# Patient Record
Sex: FEMALE | Race: WHITE | HISPANIC OR LATINO | Employment: FULL TIME | URBAN - METROPOLITAN AREA
[De-identification: names, ages, dates, MRNs, and addresses within clinical notes are randomized per-mention and may not be internally consistent; named-entity substitution may affect disease eponyms.]

---

## 2017-02-03 ENCOUNTER — HOSPITAL ENCOUNTER (OUTPATIENT)
Dept: RADIOLOGY | Facility: HOSPITAL | Age: 42
Discharge: HOME | End: 2017-02-03
Attending: INTERNAL MEDICINE
Payer: COMMERCIAL

## 2017-02-03 DIAGNOSIS — M54.40 LUMBAGO WITH SCIATICA: ICD-10-CM

## 2017-02-03 DIAGNOSIS — R20.2 NUMBNESS AND TINGLING IN LEFT HAND: ICD-10-CM

## 2017-02-03 DIAGNOSIS — M54.40 LUMBAGO WITH SCIATICA: Primary | ICD-10-CM

## 2017-02-03 DIAGNOSIS — R20.0 NUMBNESS AND TINGLING IN LEFT HAND: ICD-10-CM

## 2017-02-03 PROCEDURE — 73100 X-RAY EXAM OF WRIST: CPT | Mod: TC,LT

## 2017-02-03 PROCEDURE — 72100 X-RAY EXAM L-S SPINE 2/3 VWS: CPT | Mod: 26,,, | Performed by: RADIOLOGY

## 2017-02-03 PROCEDURE — 72120 X-RAY BEND ONLY L-S SPINE: CPT | Mod: TC

## 2017-02-03 PROCEDURE — 73100 X-RAY EXAM OF WRIST: CPT | Mod: 26,LT,, | Performed by: RADIOLOGY

## 2017-02-03 PROCEDURE — 73130 X-RAY EXAM OF HAND: CPT | Mod: TC,LT

## 2017-02-03 PROCEDURE — 72120 X-RAY BEND ONLY L-S SPINE: CPT | Mod: 26,,, | Performed by: RADIOLOGY

## 2017-02-03 PROCEDURE — 73130 X-RAY EXAM OF HAND: CPT | Mod: 26,LT,, | Performed by: RADIOLOGY

## 2017-02-11 ENCOUNTER — HOSPITAL ENCOUNTER (EMERGENCY)
Facility: HOSPITAL | Age: 42
Discharge: HOME | End: 2017-02-11
Attending: EMERGENCY MEDICINE
Payer: COMMERCIAL

## 2017-02-11 VITALS
TEMPERATURE: 99 F | OXYGEN SATURATION: 99 % | HEIGHT: 63 IN | RESPIRATION RATE: 18 BRPM | BODY MASS INDEX: 31.71 KG/M2 | DIASTOLIC BLOOD PRESSURE: 65 MMHG | SYSTOLIC BLOOD PRESSURE: 109 MMHG | HEART RATE: 64 BPM | WEIGHT: 179 LBS

## 2017-02-11 DIAGNOSIS — M54.31 RIGHT SIDED SCIATICA: Primary | ICD-10-CM

## 2017-02-11 LAB
B-HCG UR QL: NEGATIVE
CTP QC/QA: YES

## 2017-02-11 PROCEDURE — 99283 EMERGENCY DEPT VISIT LOW MDM: CPT | Mod: 25

## 2017-02-11 PROCEDURE — 63600175 PHARM REV CODE 636 W HCPCS: Performed by: PHYSICIAN ASSISTANT

## 2017-02-11 PROCEDURE — 96372 THER/PROPH/DIAG INJ SC/IM: CPT

## 2017-02-11 RX ORDER — KETOROLAC TROMETHAMINE 30 MG/ML
30 INJECTION, SOLUTION INTRAMUSCULAR; INTRAVENOUS
Status: COMPLETED | OUTPATIENT
Start: 2017-02-11 | End: 2017-02-11

## 2017-02-11 RX ORDER — DICLOFENAC SODIUM 50 MG/1
50 TABLET, DELAYED RELEASE ORAL 3 TIMES DAILY
Qty: 45 TABLET | Refills: 0 | Status: SHIPPED | OUTPATIENT
Start: 2017-02-11 | End: 2017-04-21

## 2017-02-11 RX ORDER — ORPHENADRINE CITRATE 30 MG/ML
30 INJECTION INTRAMUSCULAR; INTRAVENOUS
Status: COMPLETED | OUTPATIENT
Start: 2017-02-11 | End: 2017-02-11

## 2017-02-11 RX ADMIN — KETOROLAC TROMETHAMINE 30 MG: 30 INJECTION, SOLUTION INTRAMUSCULAR at 02:02

## 2017-02-11 RX ADMIN — ORPHENADRINE CITRATE 30 MG: 30 INJECTION INTRAMUSCULAR; INTRAVENOUS at 02:02

## 2017-02-11 NOTE — ED AVS SNAPSHOT
[facility] [address]  [place] [address]               Julieta Lewis   2017  2:06 PM   ED    Description:  Female : 1975   Department:  [facility]           Your Care was Coordinated By:     Provider Role From To    Paul Lew Jr., MD Attending Provider 17 [phone] --    Neha Vick PA-C Physician Assistant 17 [phone] --      Reason for Visit     Back Pain           Diagnoses this Visit        Comments    Right sided sciatica    -  Primary       ED Disposition     None           To Do List            These Medications        Disp Refills Start End    diclofenac (VOLTAREN) 50 MG EC tablet 45 tablet 0 2017     Take 1 tablet (50 mg total) by mouth 3 (three) times daily. - Oral      [facility]     [facility] -  Assistance  Registered nurses in the [facility] provide clinical advisement, health education, appointment booking, and other advisory services.  Call for this free service at [phone].             Medications           Message regarding Medications     Verify the changes and/or additions to your medication regime listed below are the same as discussed with your clinician today.  If any of these changes or additions are incorrect, please notify your healthcare provider.        START taking these NEW medications        Refills    diclofenac (VOLTAREN) 50 MG EC tablet 0    Sig: Take 1 tablet (50 mg total) by mouth 3 (three) times daily.    Class: Print    Route: Oral      These medications were administered today        Dose Freq    orphenadrine injection 30 mg 30 mg ED 1 Time    Sig: Inject 1 mL (30 mg total) into the muscle ED 1 Time.    Class: Normal    Route: Intramuscular    Cosign for Ordering: Accepted by Paul Lew Jr., MD on 2017  2:34 PM    ketorolac injection 30 mg 30 mg ED 1 Time    Sig: Inject 30 mg into the muscle ED 1 Time.    Class:  "Normal    Route: Intramuscular    Cosign for Ordering: Accepted by Paul Lew Jr., MD on 2/11/2017  2:34 PM      STOP taking these medications     diclofenac (CATAFLAM) 50 MG tablet Take 1 tablet (50 mg total) by mouth 3 (three) times daily. Take with meals t.i.d.           Verify that the below list of medications is an accurate representation of the medications you are currently taking.  If none reported, the list may be blank. If incorrect, please contact your healthcare provider. Carry this list with you in case of emergency.           Current Medications     diclofenac (VOLTAREN) 50 MG EC tablet Take 1 tablet (50 mg total) by mouth 3 (three) times daily.           Clinical Reference Information           Your Vitals Were     BP Pulse Temp Resp Height Weight    102/70 (BP Location: Left arm, Patient Position: Sitting) 62 98.2 °F (36.8 °C) (Oral) 18 5' 3" (1.6 m) 81.2 kg (179 lb)    Last Period SpO2 BMI          01/18/2017 99% 31.71 kg/m2        Allergies as of 2/11/2017     No Known Allergies      Immunizations Administered on Date of Encounter - 2/11/2017     None      ED Micro, Lab, POCT     Start Ordered       Status Ordering Provider    02/11/17 1431 02/11/17 1430  POCT urine pregnancy  Once      Acknowledged     02/11/17 0000 02/11/17 1440  POCT urine pregnancy     Comments:  This order was created through External Result Entry    Completed       ED Imaging Orders     None        Discharge Instructions         Sciatica    Sciatica is a condition that causes pain in the lower back that spreads down into the buttock, hip, and leg. Sometimes the leg pain can happen without any back pain. Sciatica happens when a spinal nerve is irritated or has pressure put on it as comes out of the spinal canal in the lower back. This most often happens when a bulge or rupture of a nearby spinal disk presses on the nerve. Sciatica can also be caused by a narrowing of the spinal canal (spinal stenosis) or spasm of the " muscle in the buttocks that the sciatic nerve passes through (pyriform muscle). Sciatica is also called lumbar radiculopathy.  Sciatica may begin after a sudden twisting or bending force, such as in a car accident. Or it can happen after a simple awkward movement. In either case, muscle spasm often also happens. Muscle spasm makes the pain worse.  A healthcare provider makes a diagnosis of sciatica from your symptoms and a physical exam. Unless you had an injury from a car accident or fall, you usually wont have X-rays taken at this time. This is because the nerves and disks in your back cant be seen on an X-ray. If the provider sees signs of a compressed nerve, you will need to schedule an MRI scan as an outpatient. Signs of a compressed nerve include loss of strength in a leg.  Most sciatica gets better with medicine, exercise, and physical therapy. If your symptoms continue after at least 3 months of medical treatment, you may need surgery or injections to your lower back.  Home care  Follow these tips when caring for yourself at home:  · You may need to stay in bed the first few days. But as soon as possible, begin sitting up or walking. This will help you avoid problems that come from staying in bed for long periods.  · When in bed, try to find a position that is comfortable. A firm mattress is best. Try lying flat on your back with pillows under your knees. You can also try lying on your side with your knees bent up toward your chest and a pillow between your knees.  · Avoid sitting for long periods. This puts more stress on your lower back than standing or walking.  · Use heat from a hot shower, hot bath, or heating pad to help ease pain. Massage can also help. You can also try using an ice pack. You can make your own ice pack by putting ice cubes in a plastic bag. Wrap the bag in a thin towel. Try both heat and cold to see which works best. Use the method that feels best for 20 minutes several times a  day.  · You may use acetaminophen or ibuprofen to ease pain, unless another pain medicine was prescribed. Note: If you have chronic liver or kidney disease, talk with your healthcare provider before taking these medicines. Also talk with your provider if youve had a stomach ulcer or gastrointestinal bleeding.  · Use safe lifting methods. Dont lift anything heavier than 15 pounds until all of the pain is gone.  Follow-up care  Follow up with your healthcare provider, or as advised. You may need physical therapy or additional tests.  If X-rays were taken, a radiologist will look at them. You will be told of any new findings that may affect your care.  When to seek medical advice  Call your healthcare provider right away if any of these occur:  · Pain gets worse even after taking prescribed medicine  · Weakness or numbness in 1 or both legs or hips  · Numbness in your groin or genital area  · You cant control your bowel or bladder  · Fever  · Redness or swelling over your back or spine   Date Last Reviewed: 8/1/2016  © 2957-6208 [employer]. [address] [place] [address]. All rights reserved. This information is not intended as a substitute for professional medical care. Always follow your healthcare professional's instructions.          Smoking Cessation     If you would like to quit smoking:   You may be eligible for free services if you are a [place] resident and started smoking cigarettes before September 1, 1988.  Call the Smoking Cessation Trust (SCT) toll free at (742) 956-0440 or (476) 849-9838.   Call 2-002-QUIT-NOW if you do not meet the above criteria.             [facility] complies with applicable Federal civil rights laws and does not discriminate on the basis of race, color, national origin, age, disability, or sex.        Language Assistance Services     ATTENTION: Language assistance services are available, free of charge. Please call [phone].       ATENCIÓN: Si habla español, tiene a galvez disposición servicios gratuitos de asistencia lingüística. Llame al [phone].     CHÚ Ý: N?u b?n nói Ti?ng Vi?t, có các d?ch v? h? tr? ngôn ng? mi?n phí dành cho b?n. G?i s? [phone].

## 2017-02-11 NOTE — ED PROVIDER NOTES
Encounter Date: 2/11/2017       History     Chief Complaint   Patient presents with    Back Pain     chronic low back pain with radiation down rt leg. denies dysuria.     Review of patient's allergies indicates:  No Known Allergies  HPI Comments: Julieta Lewis, a 41 y.o. female that presents to the ED for back pain.  Was seen at the end of December for the same issue, given injection of toradol and norflex as well as a prescription for diclofenac.  Those medicine improved her condition, however she's run out.  She has followed up with her PCP who ordered an x-ray of her spine earlier this month and has an appointment with him on Wednesday of this week.  Denies any injury, loss of bowel or bladder, rash.        Patient is a 41 y.o. female presenting with the following complaint: back pain. The history is provided by the patient.   Back Pain    This is a recurrent problem. The current episode started today. The problem occurs constantly. The pain is associated with no known injury. The pain is present in the sacro-iliac joint. The quality of the pain is described as stabbing. The pain radiates to the right leg. The pain is at a severity of 8/10. The symptoms are aggravated by bending and certain positions. Associated symptoms include leg pain and tingling. Pertinent negatives include no fever, no numbness, no bowel incontinence, no perianal numbness, no bladder incontinence, no dysuria, no pelvic pain and no weakness. She has tried NSAIDs and muscle relaxants for the symptoms. The treatment provided moderate relief. Risk factors: Works as a .       History reviewed. No pertinent past medical history.  No past medical history pertinent negatives.  History reviewed. No pertinent past surgical history.  History reviewed. No pertinent family history.  Social History   Substance Use Topics    Smoking status: Current Every Day Smoker    Smokeless tobacco: None    Alcohol use Yes      Comment: occaisional      Review of Systems   Constitutional: Negative for fever.   Gastrointestinal: Negative for abdominal distention and bowel incontinence.   Genitourinary: Negative for bladder incontinence, difficulty urinating, dysuria and pelvic pain.   Musculoskeletal: Positive for back pain. Negative for gait problem, joint swelling and neck pain.   Skin: Negative for color change.   Neurological: Positive for tingling. Negative for weakness and numbness.   All other systems reviewed and are negative.      Physical Exam   Initial Vitals   BP Pulse Resp Temp SpO2   02/11/17 1400 02/11/17 1400 02/11/17 1400 02/11/17 1400 02/11/17 1400   102/70 62 18 98.2 °F (36.8 °C) 99 %     Physical Exam    Nursing note and vitals reviewed.  Constitutional: She appears well-developed and well-nourished. No distress.   HENT:   Head: Normocephalic and atraumatic.   Right Ear: External ear normal.   Left Ear: External ear normal.   Nose: Nose normal.   Eyes: Conjunctivae and EOM are normal.   Neck: Normal range of motion.   Musculoskeletal: Normal range of motion. She exhibits no tenderness.        Cervical back: Normal.        Thoracic back: Normal.        Lumbar back: Normal.   Increase pain with flexion, extension and R lateral bend of spine.  + right-sided SLR. No bony tenderness, spinal step offs.  Normal abduction and adduction of bilateral hips.  N/V intact.     Neurological: She is alert and oriented to person, place, and time.   Skin: Skin is warm and dry. No rash noted.   Psychiatric: She has a normal mood and affect. Thought content normal.         ED Course   Procedures  Labs Reviewed - No data to display          Medical Decision Making:   Initial Assessment:   Back pain  Differential Diagnosis:   Lumbar strain, sciatica, herniated disk  ED Management:  X-rays done on 02/03/17 showed no acute abnormalities.  Norflex and Toradol given in ED with improvement of pain.  Pt states she has an appointment with PCP this week and will f/u for  potential further testing.  Strict return precautions given and patient verbalized understanding.    RX: Diclofenac                    ED Course     Clinical Impression:   The encounter diagnosis was Right sided sciatica.          Neha Vick PA-C  02/11/17 1503

## 2017-02-11 NOTE — ED NOTES
41 year old female presents to ed with chief complaint of back pain. Patient reports hx of issues with  Chronic sciatic nerve pain. Patients states pain to  Right lumbar and down in to her right leg with numbness and tingling.

## 2017-02-11 NOTE — DISCHARGE INSTRUCTIONS

## 2017-02-16 ENCOUNTER — OFFICE VISIT (OUTPATIENT)
Dept: ORTHOPEDICS | Facility: CLINIC | Age: 42
End: 2017-02-16
Payer: COMMERCIAL

## 2017-02-16 VITALS
WEIGHT: 179 LBS | SYSTOLIC BLOOD PRESSURE: 112 MMHG | HEART RATE: 74 BPM | BODY MASS INDEX: 31.71 KG/M2 | HEIGHT: 63 IN | DIASTOLIC BLOOD PRESSURE: 75 MMHG

## 2017-02-16 DIAGNOSIS — M54.16 LUMBAR RADICULOPATHY: Primary | ICD-10-CM

## 2017-02-16 PROCEDURE — 99204 OFFICE O/P NEW MOD 45 MIN: CPT | Mod: S$GLB,,, | Performed by: PHYSICIAN ASSISTANT

## 2017-02-16 PROCEDURE — 99999 PR PBB SHADOW E&M-EST. PATIENT-LVL III: CPT | Mod: PBBFAC,,, | Performed by: PHYSICIAN ASSISTANT

## 2017-02-16 RX ORDER — AMOXICILLIN AND CLAVULANATE POTASSIUM 875; 125 MG/1; MG/1
TABLET, FILM COATED ORAL
Refills: 0 | COMMUNITY
Start: 2017-01-26 | End: 2017-09-08

## 2017-02-16 RX ORDER — METHOCARBAMOL 500 MG/1
TABLET, FILM COATED ORAL
Refills: 0 | COMMUNITY
Start: 2017-02-03 | End: 2018-10-15

## 2017-02-16 RX ORDER — METHYLPREDNISOLONE 4 MG/1
TABLET ORAL
Qty: 1 PACKAGE | Refills: 0 | Status: SHIPPED | OUTPATIENT
Start: 2017-02-16 | End: 2017-09-08

## 2017-02-16 RX ORDER — HYDROCODONE BITARTRATE AND ACETAMINOPHEN 7.5; 325 MG/1; MG/1
TABLET ORAL
Refills: 0 | COMMUNITY
Start: 2017-01-26 | End: 2017-09-08

## 2017-02-16 RX ORDER — IBUPROFEN 800 MG/1
TABLET ORAL
Refills: 0 | COMMUNITY
Start: 2017-02-03 | End: 2017-04-21

## 2017-02-16 NOTE — LETTER
February 16, 2017                   [facility]  [address]  [place] [address]  Phone: [phone]   Patient: Julieta Lewis   MR Number: [id number]   YOB: 1975   Date of Visit: 2/16/2017     To whom it may concern,    Julieta Lewis was seen in the Orthopaedic clinic 2/16/2017.  She is able to return to work 2/18/2017 without limitations.      If you have any questions, please contact the office at [phone].        Sincerely,          Dannielle Cool PA-C

## 2017-02-16 NOTE — PROGRESS NOTES
DATE: 2/16/2017  PATIENT: Julieta Lewis    Supervising Physician: Paul Salas M.D.    CHIEF COMPLAINT: back and buttock pain    HISTORY:  Julieta Lewis is a 41 y.o[place] of low back and right leg pain (Back - 8, Leg - 8). The pain has been present for 2 months. The patient describes the pain as aching and sharp.  The pain is worse with standing, walking and sitting and improved by laying down. There is associated numbness and tingling. There is subjective weakness. Prior treatments have included voltaren, robaxin and norco, but no ESIs or surgery.    The patient denies myelopathic symptoms such as handwriting changes or difficulty with buttons/coins/keys. Denies perineal paresthesias, bowel/bladder dysfunction.    PAST MEDICAL/SURGICAL HISTORY:  History reviewed. No pertinent past medical history.  History reviewed. No pertinent past surgical history.    Medications:   Current Outpatient Prescriptions on File Prior to Visit   Medication Sig Dispense Refill    diclofenac (VOLTAREN) 50 MG EC tablet Take 1 tablet (50 mg total) by mouth 3 (three) times daily. 45 tablet 0     No current facility-administered medications on file prior to visit.        Social History:   Social History     Social History    Marital status:      Spouse name: N/A    Number of children: N/A    Years of education: N/A     Occupational History    Not on file.     Social History Main Topics    Smoking status: Current Every Day Smoker    Smokeless tobacco: Not on file    Alcohol use Yes      Comment: occaisional    Drug use: Not on file    Sexual activity: Not on file     Other Topics Concern    Not on file     Social History Narrative       REVIEW OF SYSTEMS:  Constitution: Negative. Negative for chills, fever and night sweats.   Cardiovascular: Negative for chest pain and syncope.   Respiratory: Negative for cough and shortness of breath.   Gastrointestinal: See HPI.  "Negative for nausea/vomiting. Negative for abdominal pain.  Genitourinary: See HPI. Negative for discoloration or dysuria.  Skin: Negative for dry skin, itching and rash.   Hematologic/Lymphatic: Negative for bleeding problem. Does not bruise/bleed easily.   Musculoskeletal: Negative for falls and muscle weakness.   Neurological: See HPI. No seizures.   Endocrine: Negative for polydipsia, polyphagia and polyuria.   Allergic/Immunologic: Negative for hives and persistent infections.     EXAM:  Visit Vitals    /75 (BP Location: Left arm, Patient Position: Sitting, BP Method: Automatic)    Pulse 74    Ht 5' 3" (1.6 m)    Wt 81.2 kg (179 lb 0.2 oz)    LMP 01/18/2017    BMI 31.71 kg/m2       General: The patient is a very pleasant 41 y.o. female in no apparent distress, the patient is oriented to person, place and time.  Psych: Normal mood and affect  HEENT: Vision grossly intact, hearing intact to the spoken word.  Lungs: Respirations unlabored.  Gait: Normal station and gait, no difficulty with toe or heel walk.   Skin: Dorsal lumbar skin negative for rashes, lesions, hairy patches and surgical scars. There is mild lumbar tenderness to palpation.  Range of motion: Lumbar range of motion is acceptable.  Spinal Balance: Global saggital and coronal spinal balance acceptable, not significant for scoliosis and kyphosis.  Musculoskeletal: No pain with the range of motion of the bilateral hips. No trochanteric tenderness to palpation.  Vascular: Bilateral lower extremities warm and well perfused, dorsalis pedis pulses 2+ bilaterally.  Neurological: Normal strength and tone in all major motor groups in the bilateral lower extremities.  There is decreased strength on the right compared to the left due to pain.  Normal sensation to light touch in the L2-S1 dermatomes bilaterally with the exception of decreased sensation in the L4/5 dermatomes on the right.  Deep tendon reflexes symmetric 2+ in the bilateral lower " extremities.  Negative Babinski bilaterally. Straight leg raise positive bilaterally, reproduces back pain.    IMAGING:      Today I personally reviewed AP, Lat and Flex/Ex  upright L-spine films that demonstrate normal disc spacing and alignment.  No acute abnormalities.      ASSESSMENT/PLAN:    Diagnoses and all orders for this visit:    Lumbar radiculopathy  -     MRI Lumbar Spine Without Contrast; Future    Other orders  -     methylPREDNISolone (MEDROL DOSEPACK) 4 mg tablet; use as directed      The patient has had back pain and right leg pain for the last two months that has failed to respond to medications.  MRI lumbar spine for further evaluation.  Follow up after the MRI to discuss results and further treatment including ESIs and possibly surgery.       Return if symptoms worsen or fail to improve.

## 2017-03-03 ENCOUNTER — TELEPHONE (OUTPATIENT)
Dept: ORTHOPEDICS | Facility: CLINIC | Age: 42
End: 2017-03-03

## 2017-03-03 NOTE — TELEPHONE ENCOUNTER
Called pt and explained to her that the medrol dosepack is a one time use RX, it can not be refilled. She explained to me why she did not make her appointment on Thursday 3/2/17 to see Dannielle Cool PA-C. She had missed her MRI appointment and needed to reschedule. I rescheduled her MRI and her MRI follow up appt. Patients verbally confirmed understanding.     Kenisha

## 2017-03-09 ENCOUNTER — HOSPITAL ENCOUNTER (OUTPATIENT)
Dept: RADIOLOGY | Facility: HOSPITAL | Age: 42
Discharge: HOME | End: 2017-03-09
Attending: ORTHOPAEDIC SURGERY
Payer: COMMERCIAL

## 2017-03-09 DIAGNOSIS — M54.16 LUMBAR RADICULOPATHY: ICD-10-CM

## 2017-03-09 PROCEDURE — 72148 MRI LUMBAR SPINE W/O DYE: CPT | Mod: 26,,, | Performed by: RADIOLOGY

## 2017-03-09 PROCEDURE — 72148 MRI LUMBAR SPINE W/O DYE: CPT | Mod: TC

## 2017-04-21 ENCOUNTER — OFFICE VISIT (OUTPATIENT)
Dept: ORTHOPEDICS | Facility: CLINIC | Age: 42
End: 2017-04-21
Payer: COMMERCIAL

## 2017-04-21 VITALS — WEIGHT: 179 LBS | BODY MASS INDEX: 31.71 KG/M2 | HEIGHT: 63 IN

## 2017-04-21 DIAGNOSIS — M54.5 LOW BACK PAIN, UNSPECIFIED BACK PAIN LATERALITY, UNSPECIFIED CHRONICITY, WITH SCIATICA PRESENCE UNSPECIFIED: Primary | ICD-10-CM

## 2017-04-21 PROCEDURE — 99213 OFFICE O/P EST LOW 20 MIN: CPT | Mod: S$GLB,,, | Performed by: PHYSICIAN ASSISTANT

## 2017-04-21 PROCEDURE — 1160F RVW MEDS BY RX/DR IN RCRD: CPT | Mod: S$GLB,,, | Performed by: PHYSICIAN ASSISTANT

## 2017-04-21 PROCEDURE — 99999 PR PBB SHADOW E&M-EST. PATIENT-LVL III: CPT | Mod: PBBFAC,,, | Performed by: PHYSICIAN ASSISTANT

## 2017-04-21 RX ORDER — GABAPENTIN 300 MG/1
CAPSULE ORAL
Refills: 1 | COMMUNITY
Start: 2017-02-14 | End: 2018-10-15

## 2017-04-21 RX ORDER — DICLOFENAC SODIUM 75 MG/1
75 TABLET, DELAYED RELEASE ORAL 2 TIMES DAILY
Qty: 60 TABLET | Refills: 1 | Status: SHIPPED | OUTPATIENT
Start: 2017-04-21 | End: 2017-04-24 | Stop reason: SDUPTHER

## 2017-04-21 NOTE — PROGRESS NOTES
"DATE: 4/21/2017  PATIENT: Julieta Lewis    Attending Physician: Paul Salas M.D.    HISTORY:  Julieta Lewis is a 42 y.o. female who returns to me today for follow up of back pain.  She was last seen by me 2/16/2017.  Today she is doing well but notes the pain has improved but she continues to have intermittent back pain particularly when going from sitting to standing.  She also continues to have bilateral wrist pain.     The Patient denies myelopathic symptoms such as handwriting changes or difficulty with buttons/coins/keys. Denies perineal paresthesias, bowel/bladder dysfunction.    PMH/PSH/FamHx/SocHx:  Unchanged from prior visit    ROS:  REVIEW OF SYSTEMS:  Constitution: Negative. Negative for chills, fever and night sweats.   HENT: Negative for congestion and headaches.    Eyes: Negative for blurred vision, left vision loss and right vision loss.   Cardiovascular: Negative for chest pain and syncope.   Respiratory: Negative for cough and shortness of breath.    Endocrine: Negative for polydipsia, polyphagia and polyuria.   Hematologic/Lymphatic: Negative for bleeding problem. Does not bruise/bleed easily.   Skin: Negative for dry skin, itching and rash.   Musculoskeletal: Negative for falls and muscle weakness.   Gastrointestinal: Negative for abdominal pain and bowel incontinence.   Allergic/Immunologic: Negative for hives and persistent infections.  Genitourinary: Negative for urinary retention/incontinence and nocturia.   Neurological: negative for disturbances in coordination, no myelopathic symptoms such as handwriting changes or difficulty with buttons, coins, keys or small objects. No loss of balance and seizures.   Psychiatric/Behavioral: Negative for depression. The patient does not have insomnia.   Denies perineal paresthesias, bowel or bladder incontinence    EXAM:  Ht 5' 3" (1.6 m)  Wt 81.2 kg (179 lb 0.2 oz)  LMP 02/21/2017  BMI 31.71 kg/m2    My physical examination was notable for the " following findings:     Normal station and gait, no difficulty with toe or heel walk.   Dorsal lumbar skin negative for rashes, lesions, hairy patches and surgical scars. There is mild lumbar tenderness to palpation.  Lumbar range of motion is acceptable.  Global saggital and coronal spinal balance acceptable, not significant for scoliosis and kyphosis.  No pain with the range of motion of the bilateral hips. No trochanteric tenderness to palpation.  Bilateral lower extremities warm and well perfused, dorsalis pedis pulses 2+ bilaterally.  Normal strength and tone in all major motor groups in the bilateral lower extremities. Normal sensation to light touch in the L2-S1 dermatomes bilaterally.  Deep tendon reflexes symmetric 2+ in the bilateral lower extremities.  Negative Babinski bilaterally. Straight leg raise negative bilaterally.      IMAGING:  No new imaging today.    Today I personally re- reviewed AP, Lat and Flex/Ex  upright L-spine that demonstrate normal disc spacing and alignment.  No acute abnormalities.     MRI lumbar spine demonstrates a large Schmorl's node at L5.  There is a small annular tear at L5/S1.       ASSESSMENT/PLAN:    Diagnoses and all orders for this visit:    Low back pain, unspecified back pain laterality, unspecified chronicity, with sciatica presence unspecified  -     Ambulatory Referral to Physical/Occupational Therapy    Other orders  -     diclofenac (VOLTAREN) 75 MG EC tablet; Take 1 tablet (75 mg total) by mouth 2 (two) times daily.    Referral for PT at [facility] today.  Appointment scheduled with hand clinic. Follow up after therapy in about 6 weeks if symptoms persist.     Return if symptoms worsen or fail to improve.

## 2017-04-24 RX ORDER — DICLOFENAC SODIUM 75 MG/1
75 TABLET, DELAYED RELEASE ORAL 2 TIMES DAILY
Qty: 60 TABLET | Refills: 1 | Status: SHIPPED | OUTPATIENT
Start: 2017-04-24 | End: 2017-05-24

## 2017-07-31 ENCOUNTER — OFFICE VISIT (OUTPATIENT)
Dept: URGENT CARE | Facility: CLINIC | Age: 42
End: 2017-07-31
Payer: COMMERCIAL

## 2017-07-31 VITALS
SYSTOLIC BLOOD PRESSURE: 110 MMHG | WEIGHT: 180 LBS | BODY MASS INDEX: 33.13 KG/M2 | HEIGHT: 62 IN | HEART RATE: 58 BPM | RESPIRATION RATE: 16 BRPM | TEMPERATURE: 98 F | DIASTOLIC BLOOD PRESSURE: 76 MMHG | OXYGEN SATURATION: 99 %

## 2017-07-31 DIAGNOSIS — M25.512 BILATERAL SHOULDER PAIN, UNSPECIFIED CHRONICITY: ICD-10-CM

## 2017-07-31 DIAGNOSIS — G56.03 BILATERAL CARPAL TUNNEL SYNDROME: Primary | ICD-10-CM

## 2017-07-31 DIAGNOSIS — M25.511 BILATERAL SHOULDER PAIN, UNSPECIFIED CHRONICITY: ICD-10-CM

## 2017-07-31 PROCEDURE — 99203 OFFICE O/P NEW LOW 30 MIN: CPT | Mod: 25,S$GLB,, | Performed by: FAMILY MEDICINE

## 2017-07-31 PROCEDURE — 96372 THER/PROPH/DIAG INJ SC/IM: CPT | Mod: S$GLB,,, | Performed by: FAMILY MEDICINE

## 2017-07-31 RX ORDER — BETAMETHASONE SODIUM PHOSPHATE AND BETAMETHASONE ACETATE 3; 3 MG/ML; MG/ML
9 INJECTION, SUSPENSION INTRA-ARTICULAR; INTRALESIONAL; INTRAMUSCULAR; SOFT TISSUE
Status: COMPLETED | OUTPATIENT
Start: 2017-07-31 | End: 2017-07-31

## 2017-07-31 RX ORDER — DICLOFENAC POTASSIUM 50 MG/1
50 TABLET, FILM COATED ORAL 3 TIMES DAILY
Qty: 30 TABLET | Refills: 0 | Status: SHIPPED | OUTPATIENT
Start: 2017-07-31 | End: 2017-12-31

## 2017-07-31 RX ADMIN — BETAMETHASONE SODIUM PHOSPHATE AND BETAMETHASONE ACETATE 9 MG: 3; 3 INJECTION, SUSPENSION INTRA-ARTICULAR; INTRALESIONAL; INTRAMUSCULAR; SOFT TISSUE at 05:07

## 2017-07-31 NOTE — LETTER
July 31, 2017      [facility]  [place] [address]  Phone: [phone]  Fax: [phone]       Patient: Julieta Lewis   YOB: 1975  Date of Visit: 07/31/2017    To Whom It May Concern:    Julieta Wang was at [facility] on 07/31/2017. She may return to work/school on 08/01/2017 with no restrictions. If you have any questions or concerns, or if I can be of further assistance, please do not hesitate to contact me.    Sincerely,    Pascale Chacon MA

## 2017-07-31 NOTE — PATIENT INSTRUCTIONS
Carpal Tunnel Syndrome    Carpal tunnel syndrome is a painful condition of the wrist and arm. It is caused by pressure on the median nerve.  The median nerve is one of the nerves that give feeling and movement to the hand. It passes through a tunnel in the wrist called the carpal tunnel. This tunnel is made up of bones and ligaments. Narrowing of this tunnel or swelling of the tissues inside the tunnel puts pressure on the median nerve. This causes numbness, pins and needles, or electric shooting pains in your hand and forearm. Often the pain is worse at night and may wake you when you are asleep.  Carpal tunnel syndrome may occur during pregnancy and with use of birth control pills. It is more common in workers who must often bend their wrists. It is also common in people who work with power tools that cause strong vibrations.  Home care  · Rest the painful wrist. Avoid repeated bending of the wrist back and forth. This puts pressure on the median nerve. Avoid using power tools with strong vibrations.  · If you were given a splint, wear it at night while you sleep. You may also wear it during the day for comfort.  · Move your fingers and wrists often to avoid stiffness.  · Elevate your arms on pillows when you lie down.  · Try using the unaffected hand more.  · Try not to hold your wrists in a bent, downward position.  · Sometimes changes in the work place may ease symptoms. If you type most of the day, it may help to change the position of your keyboard or add a wrist support. Your wrist should be in a neutral position and not bent back when typing.  · You may use over-the-counter pain medicine to treat pain and inflammation, unless another medicine was prescribed. Anti-inflammatory pain medicines, such as ibuprofen or naproxen may be more effective than acetaminophen, which treats pain, but not inflammation. If you have chronic liver or kidney disease or ever had a stomach ulcer or GI bleeding, talk with your  doctor before using these medicines.  · Opioid pain medicine will only give temporary relief and does not treat the problem. If pain continues, you may need a shot of a steroid drug into your wrist.  · If the above methods fail, you may need surgery. This will open the carpal tunnel and release the pressure on the trapped nerve.  Follow-up care  Follow up with your healthcare provider, or as advised, if the pain doesnt begin to improve within the next week.  If X-rays were taken, you will be notified of any new findings that may affect your care.  When to seek medical advice  Call your healthcare provider right away if any of these occur:  · Pain not improving with the above treatment  · Fingers or hand become cold, blue, numb, or tingly  · Your whole arm becomes swollen or weak  Date Last Reviewed: 11/23/2015 © 2000-2016 [employer]. [address] [place] [address]. All rights reserved. This information is not intended as a substitute for professional medical care. Always follow your healthcare professional's instructions.      Julieta was seen today for hand pain.    Diagnoses and all orders for this visit:    Bilateral carpal tunnel syndrome  -     SPLINT FOR HOME USE  -     betamethasone acetate-betamethasone sodium phosphate injection 9 mg; Inject 1.5 mLs (9 mg total) into the muscle one time.  -     diclofenac (CATAFLAM) 50 MG tablet; Take 1 tablet (50 mg total) by mouth 3 (three) times daily.  -     Ambulatory referral to Orthopedics    Bilateral shoulder pain, unspecified chronicity            Follow Up Comments   Make sure that you follow up with your primary care doctor in the next 2-5 days if needed .  Return to the Urgent Care if signs or symptoms change and certainly if you have worsening symptoms go to the nearest emergency department for further evaluation.     Michelle Pacheco MD

## 2017-07-31 NOTE — PROGRESS NOTES
"Subjective:       Patient ID: Julieta Lewis is a 42 y.o. female.    Vitals:  height is 5' 2" (1.575 m) and weight is 81.6 kg (180 lb). Her temperature is 98.2 °F (36.8 °C). Her blood pressure is 110/76 and her pulse is 58 (abnormal). Her respiration is 16 and oxygen saturation is 99%.     Chief Complaint: Hand Pain    Hand Pain    Incident onset: No trauma. There was no injury mechanism. The pain is present in the left hand, left shoulder, right hand and right shoulder. The quality of the pain is described as aching and burning. The pain radiates to the right arm and left arm. The pain is at a severity of 8/10. The pain is moderate. The pain has been worsening since the incident. Associated symptoms include numbness and tingling. Pertinent negatives include no chest pain. The symptoms are aggravated by movement. She has tried NSAIDs for the symptoms.     Review of Systems   Constitution: Negative for chills and fever.   HENT: Negative for headaches and sore throat.    Eyes: Negative for blurred vision.   Cardiovascular: Negative for chest pain.   Respiratory: Negative for shortness of breath.    Skin: Negative for rash.   Musculoskeletal: Positive for joint pain and stiffness. Negative for back pain.   Gastrointestinal: Negative for abdominal pain, diarrhea, nausea and vomiting.   Neurological: Positive for numbness, paresthesias and tingling.   Psychiatric/Behavioral: The patient is not nervous/anxious.        Objective:      Physical Exam   Constitutional: She is oriented to person, place, and time. She appears well-developed and well-nourished. She is cooperative.  Non-toxic appearance. She does not appear ill. No distress.   HENT:   Head: Normocephalic and atraumatic.   Right Ear: Hearing, tympanic membrane, external ear and ear canal normal.   Left Ear: Hearing, tympanic membrane, external ear and ear canal normal.   Nose: Nose normal. No mucosal edema, rhinorrhea or nasal deformity. No epistaxis. Right sinus " exhibits no maxillary sinus tenderness and no frontal sinus tenderness. Left sinus exhibits no maxillary sinus tenderness and no frontal sinus tenderness.   Mouth/Throat: Uvula is midline, oropharynx is clear and moist and mucous membranes are normal. No trismus in the jaw. Normal dentition. No uvula swelling. No posterior oropharyngeal erythema.   Eyes: Conjunctivae and lids are normal. Right eye exhibits no discharge. Left eye exhibits no discharge. No scleral icterus.   Sclera clear bilat   Neck: Trachea normal, normal range of motion, full passive range of motion without pain and phonation normal. Neck supple.   Cardiovascular: Normal rate, regular rhythm, normal heart sounds, intact distal pulses and normal pulses.    Pulmonary/Chest: Effort normal and breath sounds normal. No respiratory distress.   Abdominal: Soft. Normal appearance and bowel sounds are normal. She exhibits no distension and no pulsatile midline mass.   Musculoskeletal: Normal range of motion. She exhibits no edema or deformity.        Right shoulder: She exhibits tenderness and crepitus. She exhibits normal range of motion, no swelling and no effusion.        Left shoulder: She exhibits tenderness and crepitus. She exhibits normal range of motion, no swelling and no effusion.        Right wrist: She exhibits tenderness. She exhibits normal range of motion, no swelling, no effusion, no crepitus and no deformity.        Left wrist: She exhibits tenderness. She exhibits normal range of motion, no swelling, no effusion, no crepitus and no deformity.        Arms:  Neurological: She is alert and oriented to person, place, and time. She exhibits normal muscle tone. Coordination normal.   Skin: Skin is warm, dry and intact. She is not diaphoretic. No pallor.   Psychiatric: She has a normal mood and affect. Her speech is normal and behavior is normal. Judgment and thought content normal. Cognition and memory are normal.   Nursing note and vitals  reviewed.      Assessment:       1. Bilateral carpal tunnel syndrome    2. Bilateral shoulder pain, unspecified chronicity        Plan:         Bilateral carpal tunnel syndrome  -     SPLINT FOR HOME USE  -     betamethasone acetate-betamethasone sodium phosphate injection 9 mg; Inject 1.5 mLs (9 mg total) into the muscle one time.  -     diclofenac (CATAFLAM) 50 MG tablet; Take 1 tablet (50 mg total) by mouth 3 (three) times daily.  Dispense: 30 tablet; Refill: 0  -     Ambulatory referral to Orthopedics    Bilateral shoulder pain, unspecified chronicity      Julieta was seen today for hand pain.    Diagnoses and all orders for this visit:    Bilateral carpal tunnel syndrome  -     SPLINT FOR HOME USE  -     betamethasone acetate-betamethasone sodium phosphate injection 9 mg; Inject 1.5 mLs (9 mg total) into the muscle one time.  -     diclofenac (CATAFLAM) 50 MG tablet; Take 1 tablet (50 mg total) by mouth 3 (three) times daily.  -     Ambulatory referral to Orthopedics    Bilateral shoulder pain, unspecified chronicity            Follow Up Comments   Make sure that you follow up with your primary care doctor in the next 2-5 days if needed .  Return to the Urgent Care if signs or symptoms change and certainly if you have worsening symptoms go to the nearest emergency department for further evaluation.     Michelle Pacheco MD

## 2017-08-22 ENCOUNTER — PATIENT MESSAGE (OUTPATIENT)
Dept: FAMILY MEDICINE | Facility: CLINIC | Age: 42
End: 2017-08-22

## 2017-08-31 ENCOUNTER — TELEPHONE (OUTPATIENT)
Dept: ORTHOPEDICS | Facility: CLINIC | Age: 42
End: 2017-08-31

## 2017-08-31 NOTE — TELEPHONE ENCOUNTER
I spoke with the patient and she stated she will be another 15 minutes. I informed her to let us know if she will be any longer because we may need to reschedule.

## 2017-09-08 ENCOUNTER — OFFICE VISIT (OUTPATIENT)
Dept: FAMILY MEDICINE | Facility: CLINIC | Age: 42
End: 2017-09-08
Payer: COMMERCIAL

## 2017-09-08 VITALS
DIASTOLIC BLOOD PRESSURE: 77 MMHG | WEIGHT: 186.75 LBS | HEART RATE: 71 BPM | OXYGEN SATURATION: 95 % | HEIGHT: 62 IN | BODY MASS INDEX: 34.37 KG/M2 | SYSTOLIC BLOOD PRESSURE: 130 MMHG

## 2017-09-08 DIAGNOSIS — Z23 NEED FOR INFLUENZA VACCINATION: ICD-10-CM

## 2017-09-08 DIAGNOSIS — F17.200 SMOKER: ICD-10-CM

## 2017-09-08 DIAGNOSIS — Z00.00 ANNUAL PHYSICAL EXAM: Primary | ICD-10-CM

## 2017-09-08 DIAGNOSIS — F33.0 MILD EPISODE OF RECURRENT MAJOR DEPRESSIVE DISORDER: ICD-10-CM

## 2017-09-08 PROCEDURE — 90471 IMMUNIZATION ADMIN: CPT | Mod: S$GLB,,, | Performed by: FAMILY MEDICINE

## 2017-09-08 PROCEDURE — 99999 PR PBB SHADOW E&M-EST. PATIENT-LVL III: CPT | Mod: PBBFAC,,, | Performed by: FAMILY MEDICINE

## 2017-09-08 PROCEDURE — 99386 PREV VISIT NEW AGE 40-64: CPT | Mod: 25,S$GLB,, | Performed by: FAMILY MEDICINE

## 2017-09-08 PROCEDURE — 90688 IIV4 VACCINE SPLT 0.5 ML IM: CPT | Mod: S$GLB,,, | Performed by: FAMILY MEDICINE

## 2017-09-08 NOTE — PATIENT INSTRUCTIONS
Depression  Depression is one of the most common mental health problems today. It is not just a state of unhappiness or sadness. It is a true disease. The cause seems to be related to a decrease in chemicals that transmit signals in the brain. Having a family history of depression, alcoholism, or suicide increases the risk. Chronic illness, chronic pain, migraine headaches and high emotional stress also increase the risk.  Depression is something we tend to recognize in others, but may have a hard time seeing in ourselves. It can show in many physical and emotional ways:  · Loss of appetite  · Over-eating  · Not being able to sleep  · Sleeping too much  · Tiredness not related to physical exertion  · Restlessness or irritability  · Slowness of movement or speech  · Feeling depressed or withdrawn  · Loss of interest in things you once enjoyed  · Trouble concentrating, poor memory, trouble making decisions  · Thoughts of harming or killing oneself, or thoughts that life is not worth living  · Low self-esteem  The treatment for depression may include both medicine and psychotherapy. Antidepressants can reduce suffering and can improve the ability to function during the depressed period. Therapy can offer emotional support and help you understand emotional factors that may be causing the depression.  Home care  · On-going care and support helps people manage this disease.  Find a healthcare provider and therapist who meet your needs. Seek help when you feel like you may be getting ill.  · Be kind to yourself. Make it a point to do things that you enjoy (gardening, walking in nature, going to a movie, etc.). Reward yourself for small successes.  · Take care of your physical body. Eat a balanced diet (low in saturated fat and high in fruits and vegetables). Exercise at least 3 times a week for 30 minutes. Even mild-moderate exercise (like brisk walking) can make you feel better.  · Avoid alcohol, which can make  depression worse.  · Take medicine as prescribed.  · Tell each of your healthcare providers about all of the prescription drugs, over-the-counter medicines, vitamins, and supplements you take. Certain supplements interact with medicines and can result in dangerous side effects. Ask your pharmacist when you have questions about drug interactions.  · Talk with your family and trusted friends about your feelings and thoughts. Ask them to help you recognize behavior changes early so you can get help and, if needed, medicine can be adjusted.  Follow-up care  Follow up with your healthcare provider, or as advised.  Call 911  Call 911 if you:  · Have suicidal thoughts, a suicide plan, and the means to carry out the plan  · Have trouble breathing  · Are very confused  · Feel very drowsy or have trouble awakening  · Faint or lose consciousness  · Have new chest pain that becomes more severe, lasts longer, or spreads into your shoulder, arm, neck, jaw or back  When to seek medical advice  Call your healthcare provider right away if any of these occur:  · Feeling extreme depression, fear, anxiety, or anger toward yourself or others  · Feeling out of control  · Feeling that you may try to harm yourself or another  · Hearing voices that others do not hear  · Seeing things that others do not see  · Cant sleep or eat for 3 days in a row  · Friends or family express concern over your behavior and ask you to seek help  Date Last Reviewed: 9/29/2015  © 6904-5449 [employer]. [address] [place] [address], [place] [address]. All rights reserved. This information is not intended as a substitute for professional medical care. Always follow your healthcare professional's instructions.

## 2017-09-08 NOTE — PROGRESS NOTES
Subjective:       Patient ID: Julieta Lewis is a 42 y.o. female.    Chief Complaint: No chief complaint on file.    42 years old female came to the clinic for her physical exam.  Patient with mild depression currently stable on her regimen.  No suicidal or homicidal ideations.  Patient with a BMI of 34 currently trying to lose weight.  Patient did not want a referral for the smoking cessation program.      Review of Systems   Constitutional: Positive for activity change and unexpected weight change.   HENT: Positive for hearing loss. Negative for rhinorrhea and trouble swallowing.    Eyes: Positive for visual disturbance. Negative for discharge.   Respiratory: Negative.  Negative for chest tightness and wheezing.    Cardiovascular: Negative.  Negative for chest pain and palpitations.   Gastrointestinal: Negative.  Negative for blood in stool, constipation, diarrhea and vomiting.   Endocrine: Positive for polyuria. Negative for polydipsia.   Genitourinary: Positive for menstrual problem. Negative for difficulty urinating, dysuria and hematuria.   Musculoskeletal: Positive for arthralgias, joint swelling and neck pain.   Skin: Negative.    Neurological: Positive for weakness and headaches.   Psychiatric/Behavioral: Positive for dysphoric mood. Negative for confusion.       Objective:      Physical Exam   Constitutional: She is oriented to person, place, and time. She appears well-developed and well-nourished. No distress.   HENT:   Head: Normocephalic and atraumatic.   Right Ear: External ear normal.   Left Ear: External ear normal.   Nose: Nose normal.   Mouth/Throat: Oropharynx is clear and moist. No oropharyngeal exudate.   Eyes: Conjunctivae and EOM are normal. Pupils are equal, round, and reactive to light. Right eye exhibits no discharge. Left eye exhibits no discharge. No scleral icterus.   Neck: Normal range of motion. Neck supple. No JVD present. No tracheal deviation present. No thyromegaly present.    Cardiovascular: Normal rate, regular rhythm, normal heart sounds and intact distal pulses.  Exam reveals no gallop and no friction rub.    No murmur heard.  Pulmonary/Chest: Effort normal and breath sounds normal. No stridor. No respiratory distress. She has no wheezes. She has no rales. She exhibits no tenderness.   Abdominal: Soft. Bowel sounds are normal. She exhibits no distension and no mass. There is no tenderness. There is no rebound and no guarding.   Musculoskeletal: She exhibits no edema.        Left shoulder: She exhibits decreased range of motion, tenderness and pain.   Lymphadenopathy:     She has no cervical adenopathy.   Neurological: She is alert and oriented to person, place, and time. She has normal reflexes. No cranial nerve deficit. She exhibits normal muscle tone. Coordination normal.   Skin: Skin is warm and dry. No rash noted. She is not diaphoretic. No erythema. No pallor.   Psychiatric: Her behavior is normal. Judgment and thought content normal. Her mood appears anxious. Her affect is not angry, not blunt, not labile and not inappropriate. She exhibits a depressed mood.       Assessment:       1. Annual physical exam    2. Mild episode of recurrent major depressive disorder    3. BMI 34.0-34.9,adult    4. Need for influenza vaccination    5. Smoker        Plan:         Diagnoses and all orders for this visit:    Annual physical exam  -     Comprehensive metabolic panel; Future  -     Lipid panel; Future  -     Mammo Digital Screening Bilat with CAD; Future  -     Urinalysis; Future  -     TSH; Future  -     CBC auto differential; Future    Mild episode of recurrent major depressive disorder  -     TSH; Future    BMI 34.0-34.9,adult  -     Lipid panel; Future  -     TSH; Future    Need for influenza vaccination  -     Influenza - Quadrivalent (3 years & older)    Smoker     patient is thinking about the possibility of the smoking cessation program.   Diet and physical activity to promote  weight loss.  Get the names of the medicines for the depression.

## 2017-10-12 ENCOUNTER — HOSPITAL ENCOUNTER (OUTPATIENT)
Dept: RADIOLOGY | Facility: HOSPITAL | Age: 42
Discharge: HOME | End: 2017-10-12
Attending: FAMILY MEDICINE
Payer: COMMERCIAL

## 2017-10-12 VITALS — BODY MASS INDEX: 34.23 KG/M2 | WEIGHT: 186 LBS | HEIGHT: 62 IN

## 2017-10-12 DIAGNOSIS — Z00.00 ANNUAL PHYSICAL EXAM: ICD-10-CM

## 2017-10-12 PROCEDURE — 77067 SCR MAMMO BI INCL CAD: CPT | Mod: TC

## 2017-10-12 PROCEDURE — 77067 SCR MAMMO BI INCL CAD: CPT | Mod: 26,,, | Performed by: FAMILY MEDICINE

## 2017-10-12 PROCEDURE — 77063 BREAST TOMOSYNTHESIS BI: CPT | Mod: 26,,, | Performed by: FAMILY MEDICINE

## 2017-10-30 ENCOUNTER — OFFICE VISIT (OUTPATIENT)
Dept: URGENT CARE | Facility: CLINIC | Age: 42
End: 2017-10-30
Payer: COMMERCIAL

## 2017-10-30 VITALS
HEART RATE: 65 BPM | HEIGHT: 62 IN | OXYGEN SATURATION: 100 % | SYSTOLIC BLOOD PRESSURE: 133 MMHG | WEIGHT: 180 LBS | TEMPERATURE: 97 F | RESPIRATION RATE: 18 BRPM | DIASTOLIC BLOOD PRESSURE: 94 MMHG | BODY MASS INDEX: 33.13 KG/M2

## 2017-10-30 DIAGNOSIS — J06.9 URI, ACUTE: ICD-10-CM

## 2017-10-30 DIAGNOSIS — H65.03 BILATERAL ACUTE SEROUS OTITIS MEDIA, RECURRENCE NOT SPECIFIED: Primary | ICD-10-CM

## 2017-10-30 DIAGNOSIS — R05.8 NOCTURNAL COUGH: ICD-10-CM

## 2017-10-30 PROCEDURE — 96372 THER/PROPH/DIAG INJ SC/IM: CPT | Mod: S$GLB,,, | Performed by: EMERGENCY MEDICINE

## 2017-10-30 PROCEDURE — 99214 OFFICE O/P EST MOD 30 MIN: CPT | Mod: 25,S$GLB,, | Performed by: EMERGENCY MEDICINE

## 2017-10-30 RX ORDER — CODEINE PHOSPHATE AND GUAIFENESIN 10; 100 MG/5ML; MG/5ML
10 SOLUTION ORAL EVERY 8 HOURS PRN
Qty: 150 ML | Refills: 0 | Status: SHIPPED | OUTPATIENT
Start: 2017-10-30 | End: 2017-11-04

## 2017-10-30 RX ORDER — CEFDINIR 300 MG/1
300 CAPSULE ORAL EVERY 12 HOURS
Qty: 14 CAPSULE | Refills: 0 | Status: SHIPPED | OUTPATIENT
Start: 2017-10-30 | End: 2017-11-06

## 2017-10-30 RX ORDER — BETAMETHASONE SODIUM PHOSPHATE AND BETAMETHASONE ACETATE 3; 3 MG/ML; MG/ML
9 INJECTION, SUSPENSION INTRA-ARTICULAR; INTRALESIONAL; INTRAMUSCULAR; SOFT TISSUE
Status: COMPLETED | OUTPATIENT
Start: 2017-10-30 | End: 2017-10-30

## 2017-10-30 RX ADMIN — BETAMETHASONE SODIUM PHOSPHATE AND BETAMETHASONE ACETATE 9 MG: 3; 3 INJECTION, SUSPENSION INTRA-ARTICULAR; INTRALESIONAL; INTRAMUSCULAR; SOFT TISSUE at 04:10

## 2017-10-30 NOTE — PROGRESS NOTES
"Subjective:       Patient ID: Julieta Yanes is a 42 y.o. female.    Vitals:  height is 5' 2" (1.575 m) and weight is 81.6 kg (180 lb). Her temperature is 97.4 °F (36.3 °C). Her blood pressure is 133/94 (abnormal) and her pulse is 65. Her respiration is 18 and oxygen saturation is 100%.     Chief Complaint: Sinus Problem    PATIENT REPORTS COUGH, CONGESTION, WORSE AT NIGHT, SINCE Wednesday OF LAST WEEK. NO FEVERS, NO CHILLS      Sinus Problem   This is a new problem. The current episode started in the past 7 days. The problem has been gradually worsening since onset. There has been no fever. Her pain is at a severity of 2/10. The pain is mild. Associated symptoms include chills, congestion, coughing, ear pain, headaches, a hoarse voice and a sore throat. Pertinent negatives include no shortness of breath. Past treatments include acetaminophen and oral decongestants (nyquil). The treatment provided no relief.     Review of Systems   Constitution: Positive for chills and malaise/fatigue. Negative for fever.   HENT: Positive for congestion, ear pain, hoarse voice and sore throat.    Eyes: Negative for discharge and redness.   Cardiovascular: Negative for chest pain, dyspnea on exertion and leg swelling.   Respiratory: Positive for cough, sputum production and wheezing. Negative for shortness of breath.    Musculoskeletal: Negative for myalgias.   Gastrointestinal: Negative for abdominal pain and nausea.   Neurological: Positive for headaches.       Objective:      Physical Exam   Constitutional: She is oriented to person, place, and time. She appears well-developed and well-nourished. She is cooperative.  Non-toxic appearance. She does not appear ill. No distress.   HENT:   Head: Normocephalic and atraumatic.   Right Ear: Hearing, tympanic membrane, external ear and ear canal normal.   Left Ear: Hearing, tympanic membrane, external ear and ear canal normal.   Nose: No mucosal edema, rhinorrhea or nasal deformity. No " epistaxis. Right sinus exhibits no maxillary sinus tenderness and no frontal sinus tenderness. Left sinus exhibits no maxillary sinus tenderness and no frontal sinus tenderness.   Mouth/Throat: Uvula is midline, oropharynx is clear and moist and mucous membranes are normal. No trismus in the jaw. Normal dentition. No uvula swelling. No posterior oropharyngeal erythema.   NASAL CONGESTION  TM WITH MODERATE/COPIOUS AMOUNTS OF CLOUDY FLUID, NO ERYTHEMA, NO EFFUSION   Eyes: Conjunctivae and lids are normal. No scleral icterus.   Sclera clear bilat   Neck: Trachea normal, full passive range of motion without pain and phonation normal. Neck supple.   Cardiovascular: Normal rate, regular rhythm, normal heart sounds, intact distal pulses and normal pulses.    Pulmonary/Chest: Effort normal and breath sounds normal. No respiratory distress. She has no wheezes. She has no rales.   LUNGS NORMAL, DRY COUGH ON DEEP INSPIRATION   Abdominal: Soft. Normal appearance and bowel sounds are normal. She exhibits distension. There is no tenderness.   Musculoskeletal: Normal range of motion. She exhibits no edema or deformity.   Neurological: She is alert and oriented to person, place, and time. She exhibits normal muscle tone. Coordination normal.   Skin: Skin is warm, dry and intact. She is not diaphoretic. No pallor.   Psychiatric: She has a normal mood and affect. Her speech is normal and behavior is normal. Cognition and memory are normal.   Nursing note and vitals reviewed.      Assessment:       1. Bilateral acute serous otitis media, recurrence not specified    2. URI, acute    3. Nocturnal cough        Plan:         Bilateral acute serous otitis media, recurrence not specified  -     betamethasone acetate-betamethasone sodium phosphate injection 9 mg; Inject 1.5 mLs (9 mg total) into the muscle one time.    URI, acute  -     betamethasone acetate-betamethasone sodium phosphate injection 9 mg; Inject 1.5 mLs (9 mg total) into the  muscle one time.    Nocturnal cough  -     betamethasone acetate-betamethasone sodium phosphate injection 9 mg; Inject 1.5 mLs (9 mg total) into the muscle one time.    Other orders  -     cefdinir (OMNICEF) 300 MG capsule; Take 1 capsule (300 mg total) by mouth every 12 (twelve) hours.  Dispense: 14 capsule; Refill: 0  -     guaifenesin-codeine 100-10 mg/5 ml (TUSSI-ORGANIDIN NR)  mg/5 mL syrup; Take 10 mLs by mouth every 8 (eight) hours as needed for Cough (FOR SEVERE COUG WHEN NOT DRIVING OR AT NIGHT).  Dispense: 150 mL; Refill: 0          Patient Instructions   REST AND HYDRATE WITH PLENTY OF FLUIDS  1.5 CC CELESTONE GIVEN IN CLINIC  CEFDINIR RX  OTC MUCINEX DM TWICE DAILY FOR COUGH (GENERIC Ok)  OTC ZYRTEC OR CLARITIN OR ALLEGRA (GENERIC Ok)  CHERATUSSIN AC RX FOR COUGH AT NIGHT OR WHEN SEVERE. MAY CAUSE SEDATION    SEE FLUID IN THE MIDDLE EAR SHEET  SEE URI SHEET  Earache, No Infection (Adult)  Earaches can happen without an infection. This occurs when air and fluid build up behind the eardrum causing a feeling of fullness and discomfort and reduced hearing. This is called otitis media with effusion (OME) or serous otitis media. It means there is fluid in the middle ear. It is not the same as acute otitis media, which is typically from infection.  OME can happen when you have a cold if congestion blocks the passage that drains the middle ear. This passage is called the eustachian tube. OME may also occur with nasal allergies or after a bacterial middle ear infection.    The pain or discomfort may come and go. You may hear clicking or popping sounds when you chew or swallow. You may feel that your balance is off. Or you may hear ringing in the ear.  It often takes from several weeks up to 3 months for the fluid to clear on its own. Oral pain relievers and ear drops help if there is pain. Decongestants and antihistamines sometimes help. Antibiotics don't help since there is no infection. Your doctor may  prescribe a nasal spray to help reduce swelling in the nose and eustachian tube. This can allow the ear to drain.  If your OME doesn't improve after 3 months, surgery may be used to drain the fluid and insert a small tube in the eardrum to allow continued drainage.  Because the middle ear fluid can become infected, it is important to watch for signs of an ear infection which may develop later. These signs include increased ear pain, fever, or drainage from the ear.  Home care  The following guidelines will help you care for yourself at home:  · You may use over-the-counter medicine as directed to control pain, unless another medicine was prescribed. If you have chronic liver or kidney disease or ever had a stomach ulcer or GI bleeding, talk with your doctor before using these medicines. Aspirin should never be used in anyone under 18 years of age who is ill with a fever. It may cause severe liver damage.  · You may use over-the-counter decongestants such as phenylephrine or pseudoephedrine. But they are not always helpful. Don't use nasal spray decongestants more than 3 days. Longer use can make congestion worse. Prescription nasal sprays from your doctor don't typically have those restrictions.  · Antihistamines may help if you are also having allergy symptoms.  · You may use medicines such as guaifenesin to thin mucus and promote drainage.  Follow-up care  Follow up with your healthcare provider or as advised if you are not feeling better after 3 days.  When to seek medical advice  Call your healthcare provider right away if any of the following occur:  · Your ear pain gets worse or does not start to improve   · Fever of 100.4°F (38°C) or higher, or as directed by your healthcare provider  · Fluid or blood draining from the ear  · Headache or sinus pain  · Stiff neck  · Unusual drowsiness or confusion  Date Last Reviewed: 10/1/2016  © 2887-8395 [employer]. [address] [place] [address], [place] [address].  All rights reserved. This information is not intended as a substitute for professional medical care. Always follow your healthcare professional's instructions.        Upper Respiratory Illness (Adult)  You have a upper respiratory illness (URI), which is another term for the common cold. This illness is contagious during the first few days. It is spread through the air by coughing and sneezing. It may also be spread by direct contact (touching the sick person and then touching your own eyes, nose, or mouth). Frequent handwashing will decrease risk of spread. Most viral illnesses go away within 7 to 10 days with rest and simple home remedies. Sometimes the illness may last for several weeks.     Home care  · If symptoms are severe, rest at home for the first 2 to 3 days. When you resume activity, don't let yourself get too tired.  · Avoid being exposed to cigarette smoke (yours or others).  · You may use acetaminophen or ibuprofen to control pain and fever, unless another medicine was prescribed. (Note: If you have chronic liver or kidney disease, have ever had a stomach ulcer or gastrointestinal bleeding, or are taking blood-thinning medicines, talk with your healthcare provider before using these medicines.) Aspirin should never be given to anyone under 18 years of age who is ill with a viral infection or fever. It may cause severe liver or brain damage.  · Your appetite may be poor, so a light diet is fine. Avoid dehydration by drinking 6 to 8 glasses of fluids per day (water, soft drinks, juices, tea, or soup). Extra fluids will help loosen secretions in the nose and lungs.  · Over-the-counter cold medicines will not shorten the length of time youre sick, but they may be helpful for the following symptoms: cough, sore throat, and nasal and sinus congestion. (Note: Do not use decongestants if you have high blood pressure.)  Follow-up care  Follow up with your healthcare provider, or as advised.  When to seek  medical advice  Call your healthcare provider right away if any of these occur:  · Cough with lots of colored sputum (mucus)  · Severe headache; face, neck, or ear pain  · Difficulty swallowing due to throat pain  · Fever of 100.4°F (38°C)  Call 911, or get immediate medical care  Call emergency services right away if any of these occur:  · Chest pain, shortness of breath, wheezing, or difficulty breathing  · Coughing up blood  · Inability to swallow due to throat pain  Date Last Reviewed: 9/13/2015 © 2000-2017 [employer]. [address] [place] [address]. All rights reserved. This information is not intended as a substitute for professional medical care. Always follow your healthcare professional's instructions.

## 2017-10-30 NOTE — PATIENT INSTRUCTIONS
REST AND HYDRATE WITH PLENTY OF FLUIDS  1.5 CC CELESTONE GIVEN IN CLINIC  CEFDINIR RX  OTC MUCINEX DM TWICE DAILY FOR COUGH (GENERIC Ok)  OTC ZYRTEC OR CLARITIN OR ALLEGRA (GENERIC Ok)  CHERATUSSIN AC RX FOR COUGH AT NIGHT OR WHEN SEVERE. MAY CAUSE SEDATION    SEE FLUID IN THE MIDDLE EAR SHEET  SEE URI SHEET  Earache, No Infection (Adult)  Earaches can happen without an infection. This occurs when air and fluid build up behind the eardrum causing a feeling of fullness and discomfort and reduced hearing. This is called otitis media with effusion (OME) or serous otitis media. It means there is fluid in the middle ear. It is not the same as acute otitis media, which is typically from infection.  OME can happen when you have a cold if congestion blocks the passage that drains the middle ear. This passage is called the eustachian tube. OME may also occur with nasal allergies or after a bacterial middle ear infection.    The pain or discomfort may come and go. You may hear clicking or popping sounds when you chew or swallow. You may feel that your balance is off. Or you may hear ringing in the ear.  It often takes from several weeks up to 3 months for the fluid to clear on its own. Oral pain relievers and ear drops help if there is pain. Decongestants and antihistamines sometimes help. Antibiotics don't help since there is no infection. Your doctor may prescribe a nasal spray to help reduce swelling in the nose and eustachian tube. This can allow the ear to drain.  If your OME doesn't improve after 3 months, surgery may be used to drain the fluid and insert a small tube in the eardrum to allow continued drainage.  Because the middle ear fluid can become infected, it is important to watch for signs of an ear infection which may develop later. These signs include increased ear pain, fever, or drainage from the ear.  Home care  The following guidelines will help you care for yourself at home:  · You may use over-the-counter  medicine as directed to control pain, unless another medicine was prescribed. If you have chronic liver or kidney disease or ever had a stomach ulcer or GI bleeding, talk with your doctor before using these medicines. Aspirin should never be used in anyone under 18 years of age who is ill with a fever. It may cause severe liver damage.  · You may use over-the-counter decongestants such as phenylephrine or pseudoephedrine. But they are not always helpful. Don't use nasal spray decongestants more than 3 days. Longer use can make congestion worse. Prescription nasal sprays from your doctor don't typically have those restrictions.  · Antihistamines may help if you are also having allergy symptoms.  · You may use medicines such as guaifenesin to thin mucus and promote drainage.  Follow-up care  Follow up with your healthcare provider or as advised if you are not feeling better after 3 days.  When to seek medical advice  Call your healthcare provider right away if any of the following occur:  · Your ear pain gets worse or does not start to improve   · Fever of 100.4°F (38°C) or higher, or as directed by your healthcare provider  · Fluid or blood draining from the ear  · Headache or sinus pain  · Stiff neck  · Unusual drowsiness or confusion  Date Last Reviewed: 10/1/2016  © 2527-8142 [employer] [address] [place] [address], [place] [address]. All rights reserved. This information is not intended as a substitute for professional medical care. Always follow your healthcare professional's instructions.        Upper Respiratory Illness (Adult)  You have a upper respiratory illness (URI), which is another term for the common cold. This illness is contagious during the first few days. It is spread through the air by coughing and sneezing. It may also be spread by direct contact (touching the sick person and then touching your own eyes, nose, or mouth). Frequent handwashing will decrease risk of spread. Most viral  illnesses go away within 7 to 10 days with rest and simple home remedies. Sometimes the illness may last for several weeks.     Home care  · If symptoms are severe, rest at home for the first 2 to 3 days. When you resume activity, don't let yourself get too tired.  · Avoid being exposed to cigarette smoke (yours or others).  · You may use acetaminophen or ibuprofen to control pain and fever, unless another medicine was prescribed. (Note: If you have chronic liver or kidney disease, have ever had a stomach ulcer or gastrointestinal bleeding, or are taking blood-thinning medicines, talk with your healthcare provider before using these medicines.) Aspirin should never be given to anyone under 18 years of age who is ill with a viral infection or fever. It may cause severe liver or brain damage.  · Your appetite may be poor, so a light diet is fine. Avoid dehydration by drinking 6 to 8 glasses of fluids per day (water, soft drinks, juices, tea, or soup). Extra fluids will help loosen secretions in the nose and lungs.  · Over-the-counter cold medicines will not shorten the length of time youre sick, but they may be helpful for the following symptoms: cough, sore throat, and nasal and sinus congestion. (Note: Do not use decongestants if you have high blood pressure.)  Follow-up care  Follow up with your healthcare provider, or as advised.  When to seek medical advice  Call your healthcare provider right away if any of these occur:  · Cough with lots of colored sputum (mucus)  · Severe headache; face, neck, or ear pain  · Difficulty swallowing due to throat pain  · Fever of 100.4°F (38°C)  Call 911, or get immediate medical care  Call emergency services right away if any of these occur:  · Chest pain, shortness of breath, wheezing, or difficulty breathing  · Coughing up blood  · Inability to swallow due to throat pain  Date Last Reviewed: 9/13/2015  © 9160-2165 [employer]. [address] [place] [address]  [place] [address]. All rights reserved. This information is not intended as a substitute for professional medical care. Always follow your healthcare professional's instructions.

## 2017-10-30 NOTE — LETTER
October 30, 2017      [facility] [place]  [address]  [place] [address]  Phone: [phone]  Fax: [phone]       Patient: Julieta Yanes   YOB: 1975  Date of Visit: 10/30/2017    To Whom It May Concern:    Murali Yanes  was at [facility] on 10/30/2017. She may return to work/school on 11/01/2017 with no restrictions. If you have any questions or concerns, or if I can be of further assistance, please do not hesitate to contact me.    Sincerely,          Dannielle Saavedra, RT

## 2017-11-02 ENCOUNTER — OFFICE VISIT (OUTPATIENT)
Dept: ORTHOPEDICS | Facility: CLINIC | Age: 42
End: 2017-11-02
Payer: COMMERCIAL

## 2017-11-02 VITALS — HEIGHT: 62 IN | WEIGHT: 180 LBS | BODY MASS INDEX: 33.13 KG/M2

## 2017-11-02 DIAGNOSIS — G56.03 BILATERAL CARPAL TUNNEL SYNDROME: Primary | ICD-10-CM

## 2017-11-02 PROCEDURE — 99203 OFFICE O/P NEW LOW 30 MIN: CPT | Mod: S$GLB,,, | Performed by: ORTHOPAEDIC SURGERY

## 2017-11-02 PROCEDURE — 99999 PR PBB SHADOW E&M-EST. PATIENT-LVL II: CPT | Mod: PBBFAC,,, | Performed by: ORTHOPAEDIC SURGERY

## 2017-11-02 RX ORDER — TRAMADOL HYDROCHLORIDE 50 MG/1
50 TABLET ORAL EVERY 8 HOURS PRN
Qty: 40 TABLET | Refills: 1 | Status: SHIPPED | OUTPATIENT
Start: 2017-11-02 | End: 2017-11-13

## 2017-11-02 NOTE — LETTER
November 2, 2017        Michelle Pacheco MD  [address] [address]             [facility]  [address]  [facility] [address]  Phone: [phone]   Patient: Julieta Yanes   MR Number: [id number]   YOB: 1975   Date of Visit: 11/2/2017       Dear Dr. Pacheco:    Thank you for referring Julieta Yanes to me for evaluation. Below are the relevant portions of my assessment and plan of care.            If you have questions, please do not hesitate to call me. I look forward to following Julieta along with you.    Sincerely,      Sohan Patel Jr., MD           CC  No Recipients

## 2017-11-02 NOTE — PROGRESS NOTES
INITIAL VISIT HISTORY:  A 42-year-old female presents for evaluation of   bilateral hand symptoms of about nine months' duration.  She reports numbness   and tingling, nocturnal pain and weakness in both hands, left worse than right.    She has tried using wrist braces, but they really did not fit well and were not   comfortable for her.  No recent trauma or injury is reported.  No neck problems   reported.    PAST MEDICAL HISTORY:  Unremarkable.    PAST SURGICAL HISTORY:  None listed.    FAMILY HISTORY:  Positive for hypertension, stroke, arthritis and diabetes.    SOCIAL HISTORY:  The patient smokes daily, drinks alcohol occasionally.    REVIEW OF SYSTEMS:  Negative fever, chills, rashes.    CURRENT MEDICATIONS:  Reviewed on chart.    ALLERGIES:  None.    PHYSICAL EXAMINATION:  GENERAL:  Well-developed, well-nourished female in no acute distress, alert and   oriented x3.  MUSCULOSKELETAL:  Examination of the upper extremities significant for the   hands, demonstrating mild swelling volar compartment of the wrist bilaterally.    Positive Tinel sign over the median nerve at the wrist bilaterally.  Sensation   intact in all digits.   strength slightly decreased bilateral.    IMPRESSION:  Probable bilateral carpal tunnel syndrome.    PLAN:  I explained the nature of the problem to the patient.  I have fitted her   for two new wrist braces, which are more comfortable which I would like her to   wear at night.    I have given her some Ultram for pain and I have ordered nerve conduction   studies both hands to check for carpal tunnel syndrome.  She will follow up   after the nerve test is complete.      LISSETTE  dd: 11/02/2017 09:49:44 (CDT)  td: 11/03/2017 05:49:20 (CDT)  Doc ID   #[id number]  Job ID #[id number]    CC:

## 2017-11-13 ENCOUNTER — OFFICE VISIT (OUTPATIENT)
Dept: URGENT CARE | Facility: CLINIC | Age: 42
End: 2017-11-13
Payer: COMMERCIAL

## 2017-11-13 VITALS
RESPIRATION RATE: 18 BRPM | HEART RATE: 78 BPM | BODY MASS INDEX: 34.04 KG/M2 | TEMPERATURE: 98 F | WEIGHT: 185 LBS | HEIGHT: 62 IN | OXYGEN SATURATION: 100 %

## 2017-11-13 DIAGNOSIS — H92.01 ACUTE OTALGIA, RIGHT: ICD-10-CM

## 2017-11-13 DIAGNOSIS — H65.91 RIGHT NON-SUPPURATIVE OTITIS MEDIA: Primary | ICD-10-CM

## 2017-11-13 PROCEDURE — 99214 OFFICE O/P EST MOD 30 MIN: CPT | Mod: 25,S$GLB,, | Performed by: FAMILY MEDICINE

## 2017-11-13 PROCEDURE — 96372 THER/PROPH/DIAG INJ SC/IM: CPT | Mod: S$GLB,,, | Performed by: FAMILY MEDICINE

## 2017-11-13 RX ORDER — BETAMETHASONE SODIUM PHOSPHATE AND BETAMETHASONE ACETATE 3; 3 MG/ML; MG/ML
6 INJECTION, SUSPENSION INTRA-ARTICULAR; INTRALESIONAL; INTRAMUSCULAR; SOFT TISSUE
Status: COMPLETED | OUTPATIENT
Start: 2017-11-13 | End: 2017-11-13

## 2017-11-13 RX ORDER — CEFTRIAXONE 1 G/1
1 INJECTION, POWDER, FOR SOLUTION INTRAMUSCULAR; INTRAVENOUS
Status: COMPLETED | OUTPATIENT
Start: 2017-11-13 | End: 2017-11-13

## 2017-11-13 RX ORDER — IBUPROFEN 200 MG
600 TABLET ORAL
Status: COMPLETED | OUTPATIENT
Start: 2017-11-13 | End: 2017-11-13

## 2017-11-13 RX ORDER — AMOXICILLIN AND CLAVULANATE POTASSIUM 875; 125 MG/1; MG/1
1 TABLET, FILM COATED ORAL 2 TIMES DAILY
Qty: 20 TABLET | Refills: 0 | Status: SHIPPED | OUTPATIENT
Start: 2017-11-13 | End: 2018-01-11

## 2017-11-13 RX ADMIN — Medication 600 MG: at 08:11

## 2017-11-13 RX ADMIN — CEFTRIAXONE 1 G: 1 INJECTION, POWDER, FOR SOLUTION INTRAMUSCULAR; INTRAVENOUS at 08:11

## 2017-11-13 RX ADMIN — BETAMETHASONE SODIUM PHOSPHATE AND BETAMETHASONE ACETATE 6 MG: 3; 3 INJECTION, SUSPENSION INTRA-ARTICULAR; INTRALESIONAL; INTRAMUSCULAR; SOFT TISSUE at 08:11

## 2017-11-13 NOTE — LETTER
November 13, 2017      [facility]  [address] [place] [address]  Phone: [phone]  Fax: [phone]       Patient: Julieta Yanes   YOB: 1975  Date of Visit: 11/13/2017    To Whom It May Concern:    Murali Yanes  was at [facility] on 11/13/2017. She may return to work/school on 11/14/17 with no restrictions. If you have any questions or concerns, or if I can be of further assistance, please do not hesitate to contact me.    Sincerely,    Don Smiley, RT

## 2017-11-14 NOTE — PROGRESS NOTES
"Subjective:       Patient ID: Julieta Yanes is a 42 y.o. female.    Vitals:  height is 5' 2" (1.575 m) and weight is 83.9 kg (185 lb). Her temperature is 98.3 °F (36.8 °C). Her pulse is 78. Her respiration is 18 and oxygen saturation is 100%.     Chief Complaint: Otalgia    Otalgia    There is pain in the right ear. This is a new problem. The current episode started in the past 7 days. The problem occurs constantly. The problem has been gradually worsening. There has been no fever. The pain is at a severity of 8/10. The pain is moderate. Associated symptoms include coughing, ear discharge and headaches. Pertinent negatives include no abdominal pain, diarrhea, rash, sore throat or vomiting. She has tried nothing for the symptoms. The treatment provided no relief.     Review of Systems   Constitution: Positive for chills. Negative for fever.   HENT: Positive for ear discharge and ear pain. Negative for sore throat.    Eyes: Negative for blurred vision.   Cardiovascular: Negative for chest pain.   Respiratory: Positive for cough. Negative for shortness of breath.    Skin: Negative for rash.   Musculoskeletal: Negative for back pain and joint pain.   Gastrointestinal: Negative for abdominal pain, diarrhea, nausea and vomiting.   Neurological: Positive for headaches.   Psychiatric/Behavioral: The patient is not nervous/anxious.        Objective:      Physical Exam   Constitutional: She is oriented to person, place, and time. She appears well-developed and well-nourished. She is cooperative.  Non-toxic appearance. She does not appear ill. No distress.   HENT:   Head: Normocephalic and atraumatic.   Right Ear: External ear and ear canal normal. Tympanic membrane is erythematous and retracted. Tympanic membrane mobility is abnormal. A middle ear effusion is present. Decreased hearing is noted.   Left Ear: Hearing, external ear and ear canal normal. A middle ear effusion is present.   Nose: No mucosal edema, rhinorrhea or " nasal deformity. No epistaxis. Right sinus exhibits frontal sinus tenderness. Right sinus exhibits no maxillary sinus tenderness. Left sinus exhibits frontal sinus tenderness. Left sinus exhibits no maxillary sinus tenderness.   Mouth/Throat: Uvula is midline and mucous membranes are normal. No trismus in the jaw. Normal dentition. No uvula swelling. Posterior oropharyngeal edema present. No posterior oropharyngeal erythema.   Eyes: Conjunctivae and lids are normal. No scleral icterus.   Sclera clear bilat   Neck: Trachea normal, full passive range of motion without pain and phonation normal. Neck supple.   Cardiovascular: Normal rate, regular rhythm, normal heart sounds, intact distal pulses and normal pulses.    Pulmonary/Chest: Effort normal and breath sounds normal. No respiratory distress.   Abdominal: Soft. Normal appearance and bowel sounds are normal. She exhibits no distension. There is no tenderness.   Musculoskeletal: Normal range of motion. She exhibits no edema or deformity.   Lymphadenopathy:     She has cervical adenopathy.   Neurological: She is alert and oriented to person, place, and time. She exhibits normal muscle tone. Coordination normal.   Skin: Skin is warm, dry and intact. She is not diaphoretic. No pallor.   Psychiatric: She has a normal mood and affect. Her speech is normal and behavior is normal. Judgment and thought content normal. Cognition and memory are normal.   Nursing note and vitals reviewed.      Assessment:       1. Right non-suppurative otitis media    2. Acute otalgia, right        Plan:         Right non-suppurative otitis media  -     cefTRIAXone injection 1 g; Inject 1 g into the muscle one time.  -     betamethasone acetate-betamethasone sodium phosphate injection 6 mg; Inject 1 mL (6 mg total) into the muscle one time.  -     amoxicillin-clavulanate 875-125mg (AUGMENTIN) 875-125 mg per tablet; Take 1 tablet by mouth 2 (two) times daily.  Dispense: 20 tablet; Refill:  0    Acute otalgia, right  -     ibuprofen tablet 600 mg; Take 3 tablets (600 mg total) by mouth one time.      Follow Up Comments   Make sure that you follow up with your primary care doctor in the next 2-5 days if needed .  Return to the Urgent Care if signs or symptoms change and certainly if you have worsening symptoms go to the nearest emergency department for further evaluation.

## 2017-11-14 NOTE — PATIENT INSTRUCTIONS
Middle Ear Infection (Adult)  You have an infection of the middle ear, the space behind the eardrum. This is also called acute otitis media (AOM). Sometimes it is caused by the common cold. This is because congestion can block the internal passage (eustachian tube) that drains fluid from the middle ear. When the middle ear fills with fluid, bacteria can grow there and cause an infection. Oral antibiotics are used to treat this illness, not ear drops. Symptoms usually start to improve within 1 to 2 days of treatment.    Home care  The following are general care guidelines:  · Finish all of the antibiotic medicine given, even though you may feel better after the first few days.  · You may use over-the-counter medicine, such as acetaminophen or ibuprofen, to control pain and fever, unless something else was prescribed. If you have chronic liver or kidney disease or have ever had a stomach ulcer or gastrointestinal bleeding, talk with your healthcare provider before using these medicines. Do not give aspirin to anyone under 18 years of age who has a fever. It may cause severe illness or death.  Follow-up care  Follow up with your healthcare provider, or as advised, in 2 weeks if all symptoms have not gotten better, or if hearing doesn't go back to normal within 1 month.  When to seek medical advice  Call your healthcare provider right away if any of these occur:  · Ear pain gets worse or does not improve after 3 days of treatment  · Unusual drowsiness or confusion  · Neck pain, stiff neck, or headache  · Fluid or blood draining from the ear canal  · Fever of 100.4°F (38°C) or as advised   · Seizure  Date Last Reviewed: 6/1/2016  © 0482-1791 [employer]. [address] [place] [address], [place] [address]. All rights reserved. This information is not intended as a substitute for professional medical care. Always follow your healthcare professional's instructions.      Julieta was seen today for otalgia.    Diagnoses  and all orders for this visit:    Right non-suppurative otitis media  -     cefTRIAXone injection 1 g; Inject 1 g into the muscle one time.  -     betamethasone acetate-betamethasone sodium phosphate injection 6 mg; Inject 1 mL (6 mg total) into the muscle one time.  -     amoxicillin-clavulanate 875-125mg (AUGMENTIN) 875-125 mg per tablet; Take 1 tablet by mouth 2 (two) times daily.    Acute otalgia, right  -     ibuprofen tablet 600 mg; Take 3 tablets (600 mg total) by mouth one time.            Follow Up Comments   Make sure that you follow up with your primary care doctor in the next 2-5 days if needed .  Return to the Urgent Care if signs or symptoms change and certainly if you have worsening symptoms go to the nearest emergency department for further evaluation.     Michelle Pacheco MD

## 2017-12-31 ENCOUNTER — PATIENT MESSAGE (OUTPATIENT)
Dept: ORTHOPEDICS | Facility: CLINIC | Age: 42
End: 2017-12-31

## 2017-12-31 ENCOUNTER — OFFICE VISIT (OUTPATIENT)
Dept: URGENT CARE | Facility: CLINIC | Age: 42
End: 2017-12-31
Payer: COMMERCIAL

## 2017-12-31 VITALS
RESPIRATION RATE: 18 BRPM | SYSTOLIC BLOOD PRESSURE: 118 MMHG | WEIGHT: 180 LBS | DIASTOLIC BLOOD PRESSURE: 71 MMHG | BODY MASS INDEX: 33.13 KG/M2 | OXYGEN SATURATION: 98 % | HEIGHT: 62 IN | HEART RATE: 69 BPM | TEMPERATURE: 99 F

## 2017-12-31 DIAGNOSIS — G56.03 BILATERAL CARPAL TUNNEL SYNDROME: Primary | ICD-10-CM

## 2017-12-31 PROCEDURE — 99213 OFFICE O/P EST LOW 20 MIN: CPT | Mod: 25,S$GLB,, | Performed by: FAMILY MEDICINE

## 2017-12-31 PROCEDURE — 96372 THER/PROPH/DIAG INJ SC/IM: CPT | Mod: S$GLB,,, | Performed by: FAMILY MEDICINE

## 2017-12-31 RX ORDER — NAPROXEN 500 MG/1
500 TABLET ORAL 2 TIMES DAILY WITH MEALS
Qty: 20 TABLET | Refills: 0 | Status: SHIPPED | OUTPATIENT
Start: 2017-12-31 | End: 2018-01-10

## 2017-12-31 RX ORDER — BETAMETHASONE SODIUM PHOSPHATE AND BETAMETHASONE ACETATE 3; 3 MG/ML; MG/ML
6 INJECTION, SUSPENSION INTRA-ARTICULAR; INTRALESIONAL; INTRAMUSCULAR; SOFT TISSUE
Status: COMPLETED | OUTPATIENT
Start: 2017-12-31 | End: 2017-12-31

## 2017-12-31 RX ADMIN — BETAMETHASONE SODIUM PHOSPHATE AND BETAMETHASONE ACETATE 6 MG: 3; 3 INJECTION, SUSPENSION INTRA-ARTICULAR; INTRALESIONAL; INTRAMUSCULAR; SOFT TISSUE at 03:12

## 2017-12-31 NOTE — PROGRESS NOTES
"Subjective:       Patient ID: Julieta Yanes is a 42 y.o. female.    Vitals:  height is 5' 2" (1.575 m) and weight is 81.6 kg (180 lb). Her temperature is 98.7 °F (37.1 °C). Her blood pressure is 118/71 and her pulse is 69. Her respiration is 18 and oxygen saturation is 98%.     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  She has a pending Emg study report and she has seen the hand surgeon.  She is reporting more stiffness of her wrists this morning.  Seeming worse with the cold weather.  She does have wrist splints at home.  Right now using only at night.       Hand Pain    The incident occurred more than 1 week ago. The injury mechanism is unknown. The pain is present in the left wrist and right wrist. The quality of the pain is described as stabbing. The pain is at a severity of 0/10. The patient is experiencing no pain. Associated symptoms include numbness and tingling. Pertinent negatives include no chest pain. The symptoms are aggravated by movement. She has tried rest for the symptoms. The treatment provided no relief.     Review of Systems   Constitution: Negative for chills and fever.   HENT: Negative for sore throat.    Eyes: Negative for blurred vision.   Cardiovascular: Negative for chest pain.   Respiratory: Negative for shortness of breath.    Skin: Negative for rash.   Musculoskeletal: Positive for joint pain. Negative for back pain.   Gastrointestinal: Negative for abdominal pain, diarrhea, nausea and vomiting.   Neurological: Positive for numbness and tingling. Negative for headaches.   Psychiatric/Behavioral: The patient is not nervous/anxious.        Objective:      Physical Exam   Constitutional: She is oriented to person, place, and time. She appears well-developed and well-nourished. She is cooperative.  Non-toxic appearance. She does not appear ill. No distress.   HENT:   Head: Normocephalic and atraumatic.   Right Ear: " External ear normal.   Left Ear: External ear normal.   Nose: Nose normal.   Mouth/Throat: Oropharynx is clear and moist and mucous membranes are normal. No uvula swelling.   Eyes: Lids are normal. Right eye exhibits no discharge. Left eye exhibits no discharge. No scleral icterus.   Sclera clear bilat   Neck: Neck supple.   Cardiovascular: Intact distal pulses and normal pulses.    Pulmonary/Chest: Effort normal. No respiratory distress.   Abdominal: Soft. Normal appearance and bowel sounds are normal. She exhibits no distension, no pulsatile midline mass and no mass. There is no tenderness.   Musculoskeletal: Normal range of motion. She exhibits no edema or deformity.        Right wrist: She exhibits normal range of motion, no tenderness and no bony tenderness.        Left wrist: She exhibits normal range of motion, no tenderness and no bony tenderness.   Patinet has slightly positive bilateral Tinels and phalens signs.    Neurological: She is alert and oriented to person, place, and time. She exhibits normal muscle tone. Coordination normal.   Skin: Skin is warm, dry and intact. She is not diaphoretic. No pallor.   Psychiatric: She has a normal mood and affect. Her speech is normal and behavior is normal. Judgment and thought content normal. Cognition and memory are normal.   Nursing note and vitals reviewed.      Assessment:       1. Bilateral carpal tunnel syndrome        Plan:         Bilateral carpal tunnel syndrome  -     naproxen (NAPROSYN) 500 MG tablet; Take 1 tablet (500 mg total) by mouth 2 (two) times daily with meals.  Dispense: 20 tablet; Refill: 0  -     betamethasone acetate-betamethasone sodium phosphate injection 6 mg; Inject 1 mL (6 mg total) into the muscle one time.  -     Ambulatory referral to Orthopedics      Follow Up Comments   Make sure that you follow up with your primary care doctor in the next 2-5 days if needed .  Return to the Urgent Care if signs or symptoms change and certainly if  you have worsening symptoms go to the nearest emergency department for further evaluation.

## 2017-12-31 NOTE — LETTER
December 31, 2017      [facility]  [place] [address]  Phone: [phone]  Fax: [phone]       Patient: Julieta Yanes   YOB: 1975  Date of Visit: 12/31/2017    To Whom It May Concern:    Murali Yanes  was at [facility] on 12/31/2017. She may return to work/school on 01/01/2018 with no restrictions. If you have any questions or concerns, or if I can be of further assistance, please do not hesitate to contact me.    Sincerely,    Anahi Rangel MA

## 2017-12-31 NOTE — PATIENT INSTRUCTIONS
Carpal Tunnel Syndrome    Carpal tunnel syndrome is a painful condition of the wrist and arm. It is caused by pressure on the median nerve.  The median nerve is one of the nerves that give feeling and movement to the hand. It passes through a tunnel in the wrist called the carpal tunnel. This tunnel is made up of bones and ligaments. Narrowing of this tunnel or swelling of the tissues inside the tunnel puts pressure on the median nerve. This causes numbness, pins and needles, or electric shooting pains in your hand and forearm. Often the pain is worse at night and may wake you when you are asleep.  Carpal tunnel syndrome may occur during pregnancy and with use of birth control pills. It is more common in workers who must often bend their wrists. It is also common in people who work with power tools that cause strong vibrations.  Home care  · Rest the painful wrist. Avoid repeated bending of the wrist back and forth. This puts pressure on the median nerve. Avoid using power tools with strong vibrations.  · If you were given a splint, wear it at night while you sleep. You may also wear it during the day for comfort.  · Move your fingers and wrists often to avoid stiffness.  · Elevate your arms on pillows when you lie down.  · Try using the unaffected hand more.  · Try not to hold your wrists in a bent, downward position.  · Sometimes changes in the work place may ease symptoms. If you type most of the day, it may help to change the position of your keyboard or add a wrist support. Your wrist should be in a neutral position and not bent back when typing.  · You may use over-the-counter pain medicine to treat pain and inflammation, unless another medicine was prescribed. Anti-inflammatory pain medicines, such as ibuprofen or naproxen may be more effective than acetaminophen, which treats pain, but not inflammation. If you have chronic liver or kidney disease or ever had a stomach ulcer or GI bleeding, talk with your  doctor before using these medicines.  · Opioid pain medicine will only give temporary relief and does not treat the problem. If pain continues, you may need a shot of a steroid drug into your wrist.  · If the above methods fail, you may need surgery. This will open the carpal tunnel and release the pressure on the trapped nerve.  Follow-up care  Follow up with your healthcare provider, or as advised, if the pain doesnt begin to improve within the next week.  If X-rays were taken, you will be notified of any new findings that may affect your care.  When to seek medical advice  Call your healthcare provider right away if any of these occur:  · Pain not improving with the above treatment  · Fingers or hand become cold, blue, numb, or tingly  · Your whole arm becomes swollen or weak  Date Last Reviewed: 11/23/2015  © 8489-1438 [employer]. [address] [place] [address]. All rights reserved. This information is not intended as a substitute for professional medical care. Always follow your healthcare professional's instructions.      Julieta was seen today for hand pain.    Diagnoses and all orders for this visit:    Bilateral carpal tunnel syndrome  -     naproxen (NAPROSYN) 500 MG tablet; Take 1 tablet (500 mg total) by mouth 2 (two) times daily with meals.  -     betamethasone acetate-betamethasone sodium phosphate injection 6 mg; Inject 1 mL (6 mg total) into the muscle one time.  -     Ambulatory referral to Orthopedics            Follow Up Comments   Make sure that you follow up with your primary care doctor in the next 2-5 days if needed .  Return to the Urgent Care if signs or symptoms change and certainly if you have worsening symptoms go to the nearest emergency department for further evaluation.     Michelle Pacheco MD

## 2018-01-04 ENCOUNTER — OFFICE VISIT (OUTPATIENT)
Dept: ORTHOPEDICS | Facility: CLINIC | Age: 43
End: 2018-01-04
Payer: COMMERCIAL

## 2018-01-04 VITALS — HEIGHT: 62 IN | BODY MASS INDEX: 33.13 KG/M2 | WEIGHT: 180 LBS

## 2018-01-04 DIAGNOSIS — G56.03 BILATERAL CARPAL TUNNEL SYNDROME: Primary | ICD-10-CM

## 2018-01-04 PROCEDURE — 99214 OFFICE O/P EST MOD 30 MIN: CPT | Mod: S$GLB,,, | Performed by: ORTHOPAEDIC SURGERY

## 2018-01-04 PROCEDURE — 99999 PR PBB SHADOW E&M-EST. PATIENT-LVL III: CPT | Mod: PBBFAC,,, | Performed by: ORTHOPAEDIC SURGERY

## 2018-01-04 RX ORDER — TRAMADOL HYDROCHLORIDE 50 MG/1
50 TABLET ORAL EVERY 6 HOURS PRN
Qty: 30 TABLET | Refills: 0 | Status: SHIPPED | OUTPATIENT
Start: 2018-01-04 | End: 2018-01-14

## 2018-01-04 NOTE — PROGRESS NOTES
OFFICE VISIT AND PREOP H AND P    CHIEF COMPLAINT:  Bilateral carpal tunnel syndrome.    HISTORY OF PRESENT ILLNESS:  A 42-year-old female with ongoing symptoms in both   hands for the past year or so.  Reporting numbness and tingling in both hands   and difficulty with use.  Recent nerve conduction study confirms bilateral   carpal tunnel syndrome, moderate in degree.    PAST MEDICAL HISTORY:  Unremarkable.    PAST SURGICAL HISTORY:  None.    FAMILY HISTORY:  Positive for hypertension, stroke and diabetes.    SOCIAL HISTORY:  The patient smokes daily, does not give the pack year history,   drinks alcohol occasionally.    REVIEW OF SYSTEMS:  Negative fever, chills, rashes.    CURRENT MEDICATIONS:  Reviewed on chart.    ALLERGIES:  None.    PHYSICAL EXAMINATION:  GENERAL:  Well-developed, well-nourished female in no acute distress, alert and   oriented x3.  HEENT:  Unremarkable.  LUNGS:  Clear to auscultation.  HEART:  Regular rate and rhythm.  ABDOMEN:  Soft, nontender.  EXTREMITIES:  Significant for the hands, demonstrating mild swelling bilateral   carpal tunnel area.  Positive Tinel sign.  Positive Phalen test.  Range of   motion in wrist and fingers full.   strength slightly decreased.  No atrophy   noted.    Nerve conduction study as noted above.    IMPRESSION:  Bilateral carpal tunnel syndrome.    PLAN:  The patient would like to set up surgery for bilateral carpal tunnel   release as an outpatient surgery.  The risks and benefits of surgery explained   to her today.  She understands.      LISSETTE  dd: 01/04/2018 11:29:27 (CST)  td: 01/05/2018 08:48:16 (CST)  Doc ID   #[id number]  Job ID #[id number]    CC:

## 2018-01-04 NOTE — LETTER
January 4, 2018      Michelle Pacheco MD  [address]  [facility] [address]           [facility]  [address]  [facility] [address]  Phone: [phone]          Patient: Julieta Yanes   MR Number: [id number]   YOB: 1975   Date of Visit: 1/4/2018       Dear Dr. Michelle Pacheco:    Thank you for referring Julieta Yanes to me for evaluation. Attached you will find relevant portions of my assessment and plan of care.    If you have questions, please do not hesitate to call me. I look forward to following Julieta Yanes along with you.    Sincerely,    Sohan Patel Jr., MD    Enclosure  CC:  No Recipients    If you would like to receive this communication electronically, please contact [email or web address] or [phone] to request more information on medineering Link access.    For providers and/or their staff who would like to refer a patient to [facility], please contact us through our one-stop-shop provider referral line, [facility], at [phone].    If you feel you have received this communication in error or would no longer like to receive these types of communications, please e-mail [email or web address]

## 2018-01-11 ENCOUNTER — HOSPITAL ENCOUNTER (OUTPATIENT)
Dept: PREADMISSION TESTING | Facility: HOSPITAL | Age: 43
Discharge: HOME | End: 2018-01-11
Attending: ORTHOPAEDIC SURGERY
Payer: COMMERCIAL

## 2018-01-11 ENCOUNTER — TELEPHONE (OUTPATIENT)
Dept: ORTHOPEDICS | Facility: CLINIC | Age: 43
End: 2018-01-11

## 2018-01-11 ENCOUNTER — ANESTHESIA EVENT (OUTPATIENT)
Dept: SURGERY | Facility: HOSPITAL | Age: 43
End: 2018-01-11
Payer: COMMERCIAL

## 2018-01-11 DIAGNOSIS — G56.03 BILATERAL CARPAL TUNNEL SYNDROME: Primary | ICD-10-CM

## 2018-01-11 RX ORDER — SODIUM CHLORIDE, SODIUM LACTATE, POTASSIUM CHLORIDE, CALCIUM CHLORIDE 600; 310; 30; 20 MG/100ML; MG/100ML; MG/100ML; MG/100ML
INJECTION, SOLUTION INTRAVENOUS CONTINUOUS
Status: CANCELLED | OUTPATIENT
Start: 2018-01-11

## 2018-01-11 RX ORDER — LIDOCAINE HYDROCHLORIDE 10 MG/ML
1 INJECTION, SOLUTION EPIDURAL; INFILTRATION; INTRACAUDAL; PERINEURAL ONCE
Status: CANCELLED | OUTPATIENT
Start: 2018-01-11 | End: 2018-01-11

## 2018-01-11 NOTE — PRE-PROCEDURE INSTRUCTIONS
Seng Davis [phone]    Allergies, medical, surgical, family and psychosocial histories reviewed with patient. Periop plan of care reviewed. Preop instructions given, including medications to take and to hold. Time allotted for questions to be addressed.  Patient verbalized understanding.

## 2018-01-11 NOTE — ANESTHESIA PREPROCEDURE EVALUATION
01/11/2018  Julieta Yanes is a 42 y.o., female is scheduled for bilateral CTR under MAC/gen on 1/16/2018.    History reviewed. No pertinent surgical history.    Anesthesia Evaluation    I have reviewed the Patient Summary Reports.    I have reviewed the Nursing Notes.   I have reviewed the Medications.     Review of Systems  Anesthesia Hx:  No previous Anesthesia  Neg history of prior surgery. Denies Family Hx of Anesthesia complications.    Social:  No Alcohol Use, Former Smoker Stopped smoking 1/01/2018   Hematology/Oncology:  Hematology Normal        EENT/Dental:EENT/Dental Normal   Cardiovascular:   Exercise tolerance: good Denies Hypertension.  Denies Dysrhythmias.   Denies Angina.        Pulmonary:   Denies Shortness of breath.    Renal/:  Renal/ Normal     Hepatic/GI:  Hepatic/GI Normal    Musculoskeletal:   Bilateral CTS   Neurological:  Neurology Normal    Endocrine:  Endocrine Normal    Psych:   depression          Physical Exam  General:  Obesity    Airway/Jaw/Neck:  Airway Findings: Mouth Opening: Normal Tongue: Normal  General Airway Assessment: Adult  Mallampati: II  TM Distance: Normal, at least 6 cm        Eyes/Ears/Nose:  EYES/EARS/NOSE FINDINGS: Normal   Dental:  DENTAL FINDINGS: Normal   Chest/Lungs:  Chest/Lungs Clear    Heart/Vascular:  Heart Findings: Normal Heart murmur: negative    Abdomen:  Abdomen Findings: Normal    Musculoskeletal:  Musculoskeletal Findings: (bilateral wrists) Tender Joint     Mental Status:  Mental Status Findings: Normal        Anesthesia Plan  Type of Anesthesia, risks & benefits discussed:  Anesthesia Type:  general, MAC  Patient's Preference:   Intra-op Monitoring Plan:   Intra-op Monitoring Plan Comments:   Post Op Pain Control Plan:   Post Op Pain Control Plan Comments:   Induction:   IV  Beta Blocker:  Patient is not currently on a Beta-Blocker (No  further documentation required).       Informed Consent: Patient understands risks and agrees with Anesthesia plan.  Questions answered. Anesthesia consent signed with patient.  ASA Score: 2     Day of Surgery Review of History & Physical:            Ready For Surgery From Anesthesia Perspective.

## 2018-01-11 NOTE — DISCHARGE INSTRUCTIONS
Your surgery is scheduled for 1/16/18.    Please report to Outpatient Surgery Intake Office on the 2nd FLOOR at 12:00p.m.          INSTRUCTIONS IMPORTANT!!!  ¨ Do not eat or drink after 6:00am-including water. OK to brush teeth, no   gum, candy or mints!          ____  Proceed to [facility] on 1/11/18 for additional blood test.        ____  Do not wear makeup, including mascara.  ____  No powder, lotions or creams to surgical area.  ____  Please remove all jewelry, including piercings and leave at home.  ____  No money or valuables needed. Please leave at home.  ____  Please bring any documents given by your doctor.  ____  If going home the same day, arrange for a ride home. You will not be able to             drive if Anesthesia was used.  ____  Wear loose fitting clothing. Allow for dressings, bandages.  ____  Stop Aspirin, Ibuprofen, Motrin and Aleve at least 3-5 days before surgery, unless otherwise instructed by your doctor, or the nurse.   You MAY use Tylenol/acetaminophen until day of surgery.  ____  Wash the surgical area with Hibiclens the night before surgery, and again the             morning of surgery.  Be sure to rinse hibiclens off completely (if instructed by   nurse).  ____  If you take diabetic medication, do not take am of surgery unless instructed by Doctor.  ____  Call MD for temperature above 101 degrees.  ____ Stop taking any Fish Oil supplement or any Vitamins that contain Vitamin E at least 5 days prior to surgery.  ____ Do Not wear your contact lenses the day of your procedure.  You may wear your glasses.        I have read or had read and explained to me, and understand the above information.  Additional comments or instructions:  For additional questions call [phone]     Take a Hibiclens shower twice a day for 3 days prior to surgery, including the morning of surgery.   Gargle with Listerine twice a day for 3 days prior to surgery, including the morning of  surgery.      Pre-Op Bathing Instructions    Before surgery, you can play an important role in your own health.    Because skin is not sterile, we need to be sure that your skin is as free of germs as possible. By following the instructions below, you can reduce the number of germs on your skin before surgery.    IMPORTANT: You will need to shower with a special soap called Hibiclens*, available at any pharmacy.  If you are allergic to Chlorhexidine (the antiseptic in Hibiclens), use an antibacterial soap such as Dial Soap for your preoperative shower.  You will shower with Hibiclens both the night before your surgery and the morning of your surgery.  Do not use Hibiclens on the head, face or genitals to avoid injury to those areas.    STEP #1: THE NIGHT BEFORE YOUR SURGERY     1. Do not shave the area of your body where your surgery will be performed.  2. Shower and wash your hair and body as usual with your normal soap and shampoo.  3. Rinse your hair and body thoroughly after you shower to remove all soap residue.  4. With your hand, apply one packet of Hibiclens soap to the surgical site.   5. Wash the site gently for five (5) minutes. Do not scrub your skin too hard.   6. Do not wash with your regular soap after Hibiclens is used.  7. Rinse your body thoroughly.  8. Pat yourself dry with a clean, soft towel.  9. Do not use lotion, cream, or powder.  10. Wear clean clothes.    STEP #2: THE MORNING OF YOUR SURGERY     1. Repeat Step #1.    * Not to be used by people allergic to Chlorhexidine.          Carpal Tunnel Release Surgery  Surgery may be done if your carpal tunnel syndrome (CTS) symptoms become severe. Or, you may have surgery if no other treatment brings relief. There are 2 types of CTS procedures. You will be told about the one you will have. Youll also be instructed how to prepare for it.      The goals of surgery  Two types of surgery--open and endoscopic--are used to treat CTS.  · With open  surgery, your surgeon makes one incision in your palm. Standard surgical tools are used.  · With endoscopic surgery, one or two small incisions may be made in your hand. A scope (with a very small camera attached) and tools are inserted under the carpal ligament. The surgeon then operates while watching images on a video screen. No matter which one you have, the goal remains the same: Your surgeon will relieve pressure on the median nerve. To do this, the transverse carpal ligament is cut (released).  After surgery  If youve had carpal tunnel surgery, you will spend a few hours resting before you go home. The nerve sensation and circulation in your hand will be checked at this time. For the safest healing, keep the following in mind.  · Keep your hand raised above heart level. This will help reduce swelling.  · Limit hand and wrist use as instructed. A wrist brace may be required.  · Take any pain medication as directed.  · Do hand exercises as directed by your surgeon or therapist.  When to call the surgeon  Call your surgeon if you notice any of the following:  · White or pale-blue hand or nails (If you pinch your skin or nail and the color doesnt return)  · Pain that is not relieved by prescribed medicine  · Loss of sensation or excess swelling in hand or fingers  · Fever over 100.4°F (38°C)   Date Last Reviewed: 9/11/2015  © 8676-7541 [employer]. [address] [place] [address], [place] [address]. All rights reserved. This information is not intended as a substitute for professional medical care. Always follow your healthcare professional's instructions.

## 2018-01-12 ENCOUNTER — TELEPHONE (OUTPATIENT)
Dept: ORTHOPEDICS | Facility: CLINIC | Age: 43
End: 2018-01-12

## 2018-01-12 ENCOUNTER — TELEPHONE (OUTPATIENT)
Dept: OTOLARYNGOLOGY | Facility: CLINIC | Age: 43
End: 2018-01-12

## 2018-01-12 NOTE — TELEPHONE ENCOUNTER
----- Message from Jo Ann Anderson sent at 1/12/2018 10:12 AM CST -----  Contact: self  Patient is returning a missed call to nurse   Patient can be reached at [phone]

## 2018-01-16 ENCOUNTER — ANESTHESIA (OUTPATIENT)
Dept: SURGERY | Facility: HOSPITAL | Age: 43
End: 2018-01-16
Payer: COMMERCIAL

## 2018-01-16 ENCOUNTER — SURGERY (OUTPATIENT)
Age: 43
End: 2018-01-16

## 2018-01-16 ENCOUNTER — HOSPITAL ENCOUNTER (OUTPATIENT)
Facility: HOSPITAL | Age: 43
Discharge: HOME | End: 2018-01-16
Attending: ORTHOPAEDIC SURGERY | Admitting: ORTHOPAEDIC SURGERY
Payer: COMMERCIAL

## 2018-01-16 VITALS
HEART RATE: 55 BPM | SYSTOLIC BLOOD PRESSURE: 135 MMHG | OXYGEN SATURATION: 100 % | DIASTOLIC BLOOD PRESSURE: 54 MMHG | RESPIRATION RATE: 18 BRPM | TEMPERATURE: 98 F

## 2018-01-16 VITALS — HEART RATE: 52 BPM | OXYGEN SATURATION: 100 % | DIASTOLIC BLOOD PRESSURE: 63 MMHG | SYSTOLIC BLOOD PRESSURE: 141 MMHG

## 2018-01-16 DIAGNOSIS — G56.03 BILATERAL CARPAL TUNNEL SYNDROME: ICD-10-CM

## 2018-01-16 LAB — POCT GLUCOSE: 97 MG/DL (ref 70–110)

## 2018-01-16 PROCEDURE — 37000008 HC ANESTHESIA 1ST 15 MINUTES: Performed by: ORTHOPAEDIC SURGERY

## 2018-01-16 PROCEDURE — 63600175 PHARM REV CODE 636 W HCPCS: Performed by: NURSE ANESTHETIST, CERTIFIED REGISTERED

## 2018-01-16 PROCEDURE — 64721 CARPAL TUNNEL SURGERY: CPT | Mod: 50,,, | Performed by: ORTHOPAEDIC SURGERY

## 2018-01-16 PROCEDURE — 36000707: Performed by: ORTHOPAEDIC SURGERY

## 2018-01-16 PROCEDURE — 25000003 PHARM REV CODE 250: Performed by: NURSE PRACTITIONER

## 2018-01-16 PROCEDURE — 37000009 HC ANESTHESIA EA ADD 15 MINS: Performed by: ORTHOPAEDIC SURGERY

## 2018-01-16 PROCEDURE — 25000003 PHARM REV CODE 250: Performed by: ORTHOPAEDIC SURGERY

## 2018-01-16 PROCEDURE — S0020 INJECTION, BUPIVICAINE HYDRO: HCPCS | Performed by: ORTHOPAEDIC SURGERY

## 2018-01-16 PROCEDURE — 71000015 HC POSTOP RECOV 1ST HR: Performed by: ORTHOPAEDIC SURGERY

## 2018-01-16 PROCEDURE — 36000706: Performed by: ORTHOPAEDIC SURGERY

## 2018-01-16 PROCEDURE — 63600175 PHARM REV CODE 636 W HCPCS: Performed by: ORTHOPAEDIC SURGERY

## 2018-01-16 RX ORDER — ACETAMINOPHEN 325 MG/1
650 TABLET ORAL EVERY 4 HOURS PRN
Status: DISCONTINUED | OUTPATIENT
Start: 2018-01-16 | End: 2018-01-16 | Stop reason: HOSPADM

## 2018-01-16 RX ORDER — MIDAZOLAM HYDROCHLORIDE 1 MG/ML
INJECTION INTRAMUSCULAR; INTRAVENOUS
Status: DISCONTINUED | OUTPATIENT
Start: 2018-01-16 | End: 2018-01-16

## 2018-01-16 RX ORDER — KETOROLAC TROMETHAMINE 30 MG/ML
INJECTION, SOLUTION INTRAMUSCULAR; INTRAVENOUS
Status: DISCONTINUED | OUTPATIENT
Start: 2018-01-16 | End: 2018-01-16

## 2018-01-16 RX ORDER — PROPOFOL 10 MG/ML
VIAL (ML) INTRAVENOUS CONTINUOUS PRN
Status: DISCONTINUED | OUTPATIENT
Start: 2018-01-16 | End: 2018-01-16

## 2018-01-16 RX ORDER — ONDANSETRON 8 MG/1
8 TABLET, ORALLY DISINTEGRATING ORAL EVERY 8 HOURS PRN
Status: DISCONTINUED | OUTPATIENT
Start: 2018-01-16 | End: 2018-01-16 | Stop reason: HOSPADM

## 2018-01-16 RX ORDER — BUPIVACAINE HYDROCHLORIDE 5 MG/ML
INJECTION, SOLUTION EPIDURAL; INTRACAUDAL
Status: DISCONTINUED | OUTPATIENT
Start: 2018-01-16 | End: 2018-01-16 | Stop reason: HOSPADM

## 2018-01-16 RX ORDER — CEFAZOLIN SODIUM 2 G/50ML
2 SOLUTION INTRAVENOUS
Status: DISCONTINUED | OUTPATIENT
Start: 2018-01-16 | End: 2018-01-16 | Stop reason: HOSPADM

## 2018-01-16 RX ORDER — SODIUM CHLORIDE, SODIUM LACTATE, POTASSIUM CHLORIDE, CALCIUM CHLORIDE 600; 310; 30; 20 MG/100ML; MG/100ML; MG/100ML; MG/100ML
INJECTION, SOLUTION INTRAVENOUS CONTINUOUS
Status: DISCONTINUED | OUTPATIENT
Start: 2018-01-16 | End: 2018-01-16 | Stop reason: HOSPADM

## 2018-01-16 RX ORDER — NAPROXEN 500 MG/1
500 TABLET ORAL 2 TIMES DAILY
COMMUNITY
End: 2018-10-15

## 2018-01-16 RX ORDER — FENTANYL CITRATE 50 UG/ML
INJECTION, SOLUTION INTRAMUSCULAR; INTRAVENOUS
Status: DISCONTINUED | OUTPATIENT
Start: 2018-01-16 | End: 2018-01-16

## 2018-01-16 RX ORDER — LIDOCAINE HYDROCHLORIDE AND EPINEPHRINE 10; 10 MG/ML; UG/ML
INJECTION, SOLUTION INFILTRATION; PERINEURAL
Status: DISCONTINUED | OUTPATIENT
Start: 2018-01-16 | End: 2018-01-16 | Stop reason: HOSPADM

## 2018-01-16 RX ORDER — SERTRALINE HYDROCHLORIDE 50 MG/1
50 TABLET, FILM COATED ORAL DAILY
COMMUNITY

## 2018-01-16 RX ORDER — OXYCODONE HYDROCHLORIDE 5 MG/1
10 TABLET ORAL EVERY 4 HOURS PRN
Status: DISCONTINUED | OUTPATIENT
Start: 2018-01-16 | End: 2018-01-16 | Stop reason: HOSPADM

## 2018-01-16 RX ORDER — LIDOCAINE HYDROCHLORIDE 10 MG/ML
INJECTION, SOLUTION EPIDURAL; INFILTRATION; INTRACAUDAL; PERINEURAL
Status: DISCONTINUED | OUTPATIENT
Start: 2018-01-16 | End: 2018-01-16 | Stop reason: HOSPADM

## 2018-01-16 RX ORDER — HYDROCODONE BITARTRATE AND ACETAMINOPHEN 5; 325 MG/1; MG/1
1 TABLET ORAL EVERY 4 HOURS PRN
Qty: 20 TABLET | Refills: 0 | Status: SHIPPED | OUTPATIENT
Start: 2018-01-16 | End: 2018-10-15

## 2018-01-16 RX ORDER — KETOROLAC TROMETHAMINE 30 MG/ML
30 INJECTION, SOLUTION INTRAMUSCULAR; INTRAVENOUS ONCE
Status: DISCONTINUED | OUTPATIENT
Start: 2018-01-16 | End: 2018-01-16 | Stop reason: HOSPADM

## 2018-01-16 RX ORDER — LIDOCAINE HYDROCHLORIDE 10 MG/ML
1 INJECTION, SOLUTION EPIDURAL; INFILTRATION; INTRACAUDAL; PERINEURAL ONCE
Status: DISCONTINUED | OUTPATIENT
Start: 2018-01-16 | End: 2018-01-16 | Stop reason: HOSPADM

## 2018-01-16 RX ORDER — LIDOCAINE HCL/PF 100 MG/5ML
SYRINGE (ML) INTRAVENOUS
Status: DISCONTINUED | OUTPATIENT
Start: 2018-01-16 | End: 2018-01-16

## 2018-01-16 RX ADMIN — BUPIVACAINE HYDROCHLORIDE 3 ML: 5 INJECTION, SOLUTION EPIDURAL; INTRACAUDAL; PERINEURAL at 02:01

## 2018-01-16 RX ADMIN — LIDOCAINE HYDROCHLORIDE,EPINEPHRINE BITARTRATE 7 ML: 10; .01 INJECTION, SOLUTION INFILTRATION; PERINEURAL at 02:01

## 2018-01-16 RX ADMIN — LIDOCAINE HYDROCHLORIDE 80 MG: 20 INJECTION, SOLUTION INTRAVENOUS at 01:01

## 2018-01-16 RX ADMIN — KETOROLAC TROMETHAMINE 30 MG: 30 INJECTION, SOLUTION INTRAMUSCULAR; INTRAVENOUS at 02:01

## 2018-01-16 RX ADMIN — FENTANYL CITRATE 50 MCG: 50 INJECTION, SOLUTION INTRAMUSCULAR; INTRAVENOUS at 01:01

## 2018-01-16 RX ADMIN — CEFAZOLIN SODIUM 2 G: 2 SOLUTION INTRAVENOUS at 01:01

## 2018-01-16 RX ADMIN — SODIUM CHLORIDE, SODIUM LACTATE, POTASSIUM CHLORIDE, AND CALCIUM CHLORIDE 10 ML/HR: 600; 310; 30; 20 INJECTION, SOLUTION INTRAVENOUS at 12:01

## 2018-01-16 RX ADMIN — LIDOCAINE HYDROCHLORIDE 5 ML: 10 INJECTION, SOLUTION EPIDURAL; INFILTRATION; INTRACAUDAL; PERINEURAL at 02:01

## 2018-01-16 RX ADMIN — PROPOFOL 150 MCG/KG/MIN: 10 INJECTION, EMULSION INTRAVENOUS at 01:01

## 2018-01-16 RX ADMIN — BUPIVACAINE HYDROCHLORIDE 5 ML: 5 INJECTION, SOLUTION EPIDURAL; INTRACAUDAL; PERINEURAL at 02:01

## 2018-01-16 RX ADMIN — MIDAZOLAM HYDROCHLORIDE 2 MG: 1 INJECTION, SOLUTION INTRAMUSCULAR; INTRAVENOUS at 01:01

## 2018-01-16 NOTE — TRANSFER OF CARE
Anesthesia Transfer of Care Note    Patient: Julieta Yanes    Procedure(s) Performed: Procedure(s) (LRB):  RELEASE-CARPAL TUNNEL (Bilateral)    Patient location: OPS    Anesthesia Type: MAC    Transport from OR: Transported from OR on room air with adequate spontaneous ventilation    Post pain: adequate analgesia    Post assessment: no apparent anesthetic complications    Post vital signs: stable    Level of consciousness: awake    Nausea/Vomiting: no nausea/vomiting    Complications: none    Transfer of care protocol was followed      Last vitals:   Visit Vitals  LMP 01/15/2018   Breastfeeding? No

## 2018-01-16 NOTE — DISCHARGE INSTRUCTIONS
Discharge Instructions for Carpal Tunnel Release  You had a carpal tunnel release procedure to help relieve the symptoms of carpal tunnel syndrome. In carpal tunnel syndrome, a nerve in the wrist is compressed and irritated. This causes numbness and pain in the fingers and hand. Carpal tunnel release relieves the compression of the nerve. Here are instructions that will help you care for your arm and wrist when you are at home.  Home care  · Avoid gripping objects tightly or lifting with your affected arm.  · Wear your bandage, splint, or cast as directed by your doctor.  · Always keep the dressing, splint, or cast dry and clean.  · When showering, cover your hand and  wrist with plastic and use tape or rubberbands to keep the dressing, splint, or cast dry. Shower as necessary.    · Use an ice pack or bag of frozen peas -- or something similar -- wrapped in a thin towel on your wrist to reduce swelling for the first 48 hours. Leave the ice pack on for 20 minutes; then take it off for 20 minutes. Repeat as needed.  · Keep your arm elevated above your heart for 24 to 48 hours after surgery.  · Do the exercises you learned in the hospital, or as instructed by your doctor.  · Take pain medicine as directed.  · Dont drive until your doctor says its OK. Never drive while you are taking opioid pain medicine.  · Ask your doctor when you can return to work. If your job requires heavy lifting, you may not be able to begin working again for several weeks.  Follow-up care  Make a follow-up appointment as directed by your doctor.     When to seek medical care  Call 911 right away if you have any of the following:  · Chest pain  · Shortness of breath  Otherwise, call your doctor immediately if you have any of the following:  · A splint, cast, or dressing that has gotten wet  · Increased bleeding or drainage from the incision (cut)  · Opening of the incision  · Fever above 100.4°F (38.9°C) taken by mouth, or shaking  chills  · Any new numbness in the fingers or thumb  · Blue hand or fingers  · Increased pain with or without activity  · Increased redness, tenderness, or swelling of the incision   Date Last Reviewed: 11/15/2015  © 9578-4254 [employer]. [address] [place] [address]. All rights reserved. This information is not intended as a substitute for professional medical care. Always follow your healthcare professional's instructions.    DIET: You may resume your home diet. If nausea is present, increase your diet gradually with fluids and bland foods    ACTIVITY LEVEL: You have received sedation or an anesthetic, you may feel sleepy for several hours. Rest until you are more awake. Gradually resume your normal activities    Medications: Pain medication should be taken only if needed and as directed. If antibiotics are prescribed, the medication should be taken until completed. You will be given an updated list of you medications.    No driving, alcoholic beverages or signing legal documents for next 24 hours or while taking pain medication.       CALL THE DOCTOR:    For any obvious bleeding (some dried blood over the incision is normal).      Redness, swelling, foul smell around incision or fever over 101.   Shortness of breath, Coughing up Bloody sputum, Pains or Swelling in your Calves .   Persistent pain or nausea not relieved by medication.    If any unusual problems or difficulties occur contact your doctor. If you cannot contact your doctor but feel your signs and symptoms warrant a physicians attention return to the emergency room.

## 2018-01-16 NOTE — BRIEF OP NOTE
[facility]  Brief Operative Note     SUMMARY     Surgery Date: 1/16/2018     Surgeon(s) and Role:     * Sohan Patel Jr., MD - Primary    Assisting Surgeon: None    Pre-op Diagnosis:  Bilateral carpal tunnel syndrome [G56.03]    Post-op Diagnosis:  Post-Op Diagnosis Codes:     * Bilateral carpal tunnel syndrome [G56.03]    Procedure(s) (LRB):  RELEASE-CARPAL TUNNEL (Bilateral)    Anesthesia: Local MAC    Description of the findings of the procedure: PETER CTS    Findings/Key Components: Peter CTR    Estimated Blood Loss: * No values recorded between 1/16/2018  2:14 PM and 1/16/2018  2:59 PM *         Specimens:   Specimen (12h ago through future)    None          Discharge Note    SUMMARY     Admit Date: 1/16/2018    Discharge Date and Time:  01/16/2018 3:04 PM    Hospital Course (synopsis of major diagnoses, care, treatment, and services provided during the course of the hospital stay): see op note     Final Diagnosis: Post-Op Diagnosis Codes:     * Bilateral carpal tunnel syndrome [G56.03]    Disposition: Home or Self Care    Follow Up/Patient Instructions:     Medications:  Reconciled Home Medications:   Current Discharge Medication List      START taking these medications    Details   hydrocodone-acetaminophen 5-325mg (NORCO) 5-325 mg per tablet Take 1 tablet by mouth every 4 (four) hours as needed for Pain.  Qty: 20 tablet, Refills: 0         CONTINUE these medications which have NOT CHANGED    Details   naproxen (NAPROSYN) 500 MG tablet Take 500 mg by mouth 2 (two) times daily.      gabapentin (NEURONTIN) 300 MG capsule TK ONE C PO TWO TO TID  Refills: 1      methocarbamol (ROBAXIN) 500 MG Tab TK 1 T PO TID PRN  Refills: 0      sertraline (ZOLOFT) 50 MG tablet Take 50 mg by mouth once daily.             Discharge Procedure Orders  Diet general     Shower on day dressing removed (No bath)     Call MD for:  temperature >100.4     Call MD for:  persistent nausea and vomiting     Call MD for:   severe uncontrolled pain     Keep surgical extremity elevated     Remove dressing in 24 hours       Follow-up Information     Sohan Patel Jr, MD. Schedule an appointment as soon as possible for a visit in 11 days.    Specialties:  Hand Surgery, Orthopedic Surgery  Why:  For wound re-check  Contact information:  [address]  [address] [address] [phone]

## 2018-01-16 NOTE — ANESTHESIA POSTPROCEDURE EVALUATION
Anesthesia Post Evaluation    Patient: Julieta Yanes    Procedure(s) Performed: Procedure(s) (LRB):  RELEASE-CARPAL TUNNEL (Bilateral)    Final Anesthesia Type: MAC  Patient location during evaluation: GI PACU  Patient participation: Yes- Able to Participate  Level of consciousness: awake and alert  Post-procedure vital signs: reviewed and stable  Pain management: adequate  Airway patency: patent  PONV status at discharge: No PONV  Anesthetic complications: no      Cardiovascular status: blood pressure returned to baseline and hemodynamically stable  Respiratory status: unassisted, spontaneous ventilation and room air  Hydration status: euvolemic  Follow-up not needed.        Visit Vitals  LMP 01/15/2018   Breastfeeding? No       Pain/Suhail Score: Pain Assessment Performed: Yes (1/16/2018 12:23 PM)  Presence of Pain: complains of pain/discomfort (1/16/2018 12:23 PM)

## 2018-01-16 NOTE — H&P (VIEW-ONLY)
OFFICE VISIT AND PREOP H AND P    CHIEF COMPLAINT:  Bilateral carpal tunnel syndrome.    HISTORY OF PRESENT ILLNESS:  A 42-year-old female with ongoing symptoms in both   hands for the past year or so.  Reporting numbness and tingling in both hands   and difficulty with use.  Recent nerve conduction study confirms bilateral   carpal tunnel syndrome, moderate in degree.    PAST MEDICAL HISTORY:  Unremarkable.    PAST SURGICAL HISTORY:  None.    FAMILY HISTORY:  Positive for hypertension, stroke and diabetes.    SOCIAL HISTORY:  The patient smokes daily, does not give the pack year history,   drinks alcohol occasionally.    REVIEW OF SYSTEMS:  Negative fever, chills, rashes.    CURRENT MEDICATIONS:  Reviewed on chart.    ALLERGIES:  None.    PHYSICAL EXAMINATION:  GENERAL:  Well-developed, well-nourished female in no acute distress, alert and   oriented x3.  HEENT:  Unremarkable.  LUNGS:  Clear to auscultation.  HEART:  Regular rate and rhythm.  ABDOMEN:  Soft, nontender.  EXTREMITIES:  Significant for the hands, demonstrating mild swelling bilateral   carpal tunnel area.  Positive Tinel sign.  Positive Phalen test.  Range of   motion in wrist and fingers full.   strength slightly decreased.  No atrophy   noted.    Nerve conduction study as noted above.    IMPRESSION:  Bilateral carpal tunnel syndrome.    PLAN:  The patient would like to set up surgery for bilateral carpal tunnel   release as an outpatient surgery.  The risks and benefits of surgery explained   to her today.  She understands.      LISSETTE  dd: 01/04/2018 11:29:27 (CST)  td: 01/05/2018 08:48:16 (CST)  Doc ID   #[id number]  Job ID #[id number]    CC:

## 2018-01-17 NOTE — OP NOTE
DATE OF PROCEDURE:  01/16/2018.    PREOPERATIVE DIAGNOSIS:  Bilateral carpal tunnel syndrome.    POSTOPERATIVE DIAGNOSIS:  Bilateral carpal tunnel syndrome.    OPERATIVE PROCEDURE:  Bilateral carpal tunnel release.    SURGEON:  Sohan Patel Jr., M.D.    ANESTHESIA:  MAC.    ESTIMATED BLOOD LOSS:  Minimal.    COMPLICATIONS:  None.    SPECIMENS:  None.    BRIEF INDICATIONS:  This is a 42-year-old female with bilateral carpal tunnel   syndrome, unresponsive to conservative treatment.    OPERATIVE PROCEDURE IN DETAIL:  After operative consent was obtained, the   patient brought to the Operating Room, placed supine on the operating room   table.  Anesthesia by IV sedation followed by injection of Xylocaine, Marcaine   combination into both carpal tunnels bilaterally.  The left hand was done with   epinephrine added to aide in hemostasis because of the lack of the tourniquet on   the left side.  Both arms were then prepped and draped out in the normal   sterile fashion.  The right arm had a tourniquet applied above the elbow.  The   left hand was approached first.  Again, no tourniquet was utilized on the left   hand, but a 2 cm incision made thenar crease left palm with a #15 blade.  Palmar   fascia divided, deep retractors placed.  Transverse carpal ligament identified,   carefully divided with the Glades blade.  The median nerve protected with a   [place] elevator and division of ligament continued proximally and distally under   direct visualization.  After complete release of the median nerve, the contents   of the carpal canal were inspected and noted to be intact including the   recurrent branch.  The wound irrigated.  Hemostasis achieved with the Bovie.    Subcutaneous layer closed with 4-0 Monocryl.  Dermabond on the skin.  Sterile   dressing applied.  Attention then turned to the right arm where the Esmarch used   to exsanguinate the limb and the tourniquet inflated to 225 mmHg.  A 2.5 cm   incision made thenar  crease right palm with a #15 blade.  Palmar fascia divided,   deep retractors placed.  Transverse carpal ligament identified, carefully   divided with the Sweeny blade.  The median nerve protected with a [place] elevator   and division of ligament continued proximally and distally under direct   visualization.  After complete release, the contents of the carpal canal were   inspected and noted to be intact including the recurrent branch.  The wound   irrigated.  Hemostasis achieved with the Bovie.  Subcutaneous layer closed with   4-0 Monocryl.  Dermabond on the skin.  Sterile dressing applied followed by   light wrap.  Tourniquet deflated.  The patient was brought to the Recovery Room   in stable condition.  All sponge and needle counts reported as correct.  No   complications.      SEBLE/KEENAN  dd: 01/16/2018 15:07:53 (CST)  td: 01/16/2018 21:08:54 (CST)  Doc ID   #[id number]  Job ID #[id number]    CC:

## 2018-01-29 ENCOUNTER — TELEPHONE (OUTPATIENT)
Dept: ORTHOPEDICS | Facility: CLINIC | Age: 43
End: 2018-01-29

## 2018-01-29 NOTE — TELEPHONE ENCOUNTER
----- Message from Jody Parker sent at 1/29/2018 12:05 PM CST -----  Contact: self, [phone]  Patient called to cancel today's appt requests to be seen later this week. Please advise.

## 2018-02-01 ENCOUNTER — TELEPHONE (OUTPATIENT)
Dept: ORTHOPEDICS | Facility: CLINIC | Age: 43
End: 2018-02-01

## 2018-02-01 NOTE — TELEPHONE ENCOUNTER
----- Message from Rosa Casey sent at 2/1/2018  8:23 AM CST -----  Contact: Self. [phone]  Patient would like to speak with you about rescheduling her appointment because she is having car trouble. Please advise

## 2018-02-05 ENCOUNTER — OFFICE VISIT (OUTPATIENT)
Dept: ORTHOPEDICS | Facility: CLINIC | Age: 43
End: 2018-02-05
Payer: COMMERCIAL

## 2018-02-05 DIAGNOSIS — G56.03 BILATERAL CARPAL TUNNEL SYNDROME: Primary | ICD-10-CM

## 2018-02-05 PROCEDURE — 99999 PR PBB SHADOW E&M-EST. PATIENT-LVL II: CPT | Mod: PBBFAC,,, | Performed by: ORTHOPAEDIC SURGERY

## 2018-02-05 PROCEDURE — 99024 POSTOP FOLLOW-UP VISIT: CPT | Mod: S$GLB,,, | Performed by: ORTHOPAEDIC SURGERY

## 2018-02-05 NOTE — PROGRESS NOTES
HISTORY OF PRESENT ILLNESS:  Ms. Yanes is about two weeks out from bilateral   carpal tunnel release.  She is doing well.  A little bit of soreness reported   both hands.  No numbness reported.    PHYSICAL EXAMINATION:  Both incisions are healing well, still a little bit of   rough skin on both sides, slight swelling and minimal tenderness.  No evidence   of infection.  Range of motion of fingers full.   strength decreased.    PLAN:  Routine wound care, continue Neosporin until completely healed, increase   activities as tolerated, light duty work only.  Follow up in three weeks.      LISSETTE  dd: 02/05/2018 13:39:19 (CST)  td: 02/06/2018 08:58:49 (CST)  Doc ID   #[id number]  Job ID #[id number]    CC:

## 2018-02-26 ENCOUNTER — OFFICE VISIT (OUTPATIENT)
Dept: ORTHOPEDICS | Facility: CLINIC | Age: 43
End: 2018-02-26
Payer: COMMERCIAL

## 2018-02-26 DIAGNOSIS — G56.03 BILATERAL CARPAL TUNNEL SYNDROME: Primary | ICD-10-CM

## 2018-02-26 PROCEDURE — 99024 POSTOP FOLLOW-UP VISIT: CPT | Mod: S$GLB,,, | Performed by: ORTHOPAEDIC SURGERY

## 2018-02-26 PROCEDURE — 99999 PR PBB SHADOW E&M-EST. PATIENT-LVL II: CPT | Mod: PBBFAC,,, | Performed by: ORTHOPAEDIC SURGERY

## 2018-02-26 NOTE — PROGRESS NOTES
HISTORY OF PRESENT ILLNESS:  Ms. Yanes in followup of bilateral carpal tunnel   release.  She is one month postop.  She is doing well, having a little bit of   difficulty at work.  She is sort of trying to get through things at work, but   some stiffness and weakness reported.    PHYSICAL EXAMINATION:  Both hands are well healed, a little bit of swelling,   particularly on the left hand, maybe a little bit more than the right.  Range of   motion in wrist and fingers full.   strength decreased.  Sensation intact.    PLAN:  I ordered some therapy for her in [place] close to where she lives for   range of motion, stretching and strengthening.  We will also start her on some   Pennsaid topical anti-inflammatory cream for both hands.  Follow up in 3-4   weeks.      LISSETTE  dd: 02/26/2018 14:13:10 (CST)  td: 02/27/2018 09:39:53 (CST)  Doc ID   #[id number]  Job ID #[id number]    CC:

## 2018-03-05 ENCOUNTER — PATIENT MESSAGE (OUTPATIENT)
Dept: ORTHOPEDICS | Facility: CLINIC | Age: 43
End: 2018-03-05

## 2018-03-07 ENCOUNTER — PATIENT MESSAGE (OUTPATIENT)
Dept: ORTHOPEDICS | Facility: CLINIC | Age: 43
End: 2018-03-07

## 2018-03-22 ENCOUNTER — OFFICE VISIT (OUTPATIENT)
Dept: ORTHOPEDICS | Facility: CLINIC | Age: 43
End: 2018-03-22
Payer: COMMERCIAL

## 2018-03-22 DIAGNOSIS — G56.03 BILATERAL CARPAL TUNNEL SYNDROME: Primary | ICD-10-CM

## 2018-03-22 PROCEDURE — 99999 PR PBB SHADOW E&M-EST. PATIENT-LVL II: CPT | Mod: PBBFAC,,, | Performed by: ORTHOPAEDIC SURGERY

## 2018-03-22 PROCEDURE — 99024 POSTOP FOLLOW-UP VISIT: CPT | Mod: S$GLB,,, | Performed by: ORTHOPAEDIC SURGERY

## 2018-03-22 NOTE — PROGRESS NOTES
HISTORY OF PRESENT ILLNESS:  Ms. Yanes is about two months out from bilateral   carpal tunnel release.  She is doing a little bit better.  She would like to   continue therapy, but switch over to [place], still having a little bit of   soreness and would like a note for work.    PHYSICAL EXAMINATION:  Both hands demonstrate healed incisions, slight swelling   and minimal tenderness.  Range of motion in wrist and fingers full.     strength slightly decreased.    PLAN:  We will continue therapy in [place] OT, increase activities as tolerated.    We will restrict her four hours lifting per day at work.  Follow up in one   month.      LISSETTE  dd: 03/22/2018 14:47:47 (CDT)  td: 03/23/2018 12:22:04 (CDT)  Doc ID   #[id number]  Job ID #[id number]    CC:

## 2018-04-03 ENCOUNTER — CLINICAL SUPPORT (OUTPATIENT)
Dept: REHABILITATION | Facility: HOSPITAL | Age: 43
End: 2018-04-03
Attending: ORTHOPAEDIC SURGERY
Payer: COMMERCIAL

## 2018-04-03 DIAGNOSIS — M25.539 PAIN IN WRIST, UNSPECIFIED LATERALITY: ICD-10-CM

## 2018-04-03 PROCEDURE — 97110 THERAPEUTIC EXERCISES: CPT | Mod: PO

## 2018-04-03 PROCEDURE — 97165 OT EVAL LOW COMPLEX 30 MIN: CPT | Mod: PO

## 2018-04-03 NOTE — PATIENT INSTRUCTIONS
Scar Tissue Massage        Place pad of fingertip on scar area. Apply steady downward pressure while moving in circular fashion. Use another fin-jerrod on top to assist. Repeat until entire scar has been covered.  Repeat ____ times. Do ____ sessions per day.  Copyright © [employer]. All rights reserved.   Copyright © I. All rights reserved. Flexion (Resistive)        With hand palm-up and holding __16__ ounces (bottle water), bend hand toward you at wrist. Hold __3__ seconds. Relax slowly.  Repeat __15__ times. Do __2__ sessions per day. EVERY OTHER DAY     Copyright © [employer]. All rights reserved.   Extension (Resistive)        With wrist over edge of table, lift __16__ ounces, keeping arm on table surface. Hold _3___ seconds. Lower slowly.  Repeat __15__ times. Do __2__ sessions per day. EVERY OTHER DAY .EVERY OTHER DAY   Activity: Throw a Frisbee.    Copyright © [employer]. All rights reserved.     Radial Deviation (Resistive)        Holding __16__ ounces, bend wrist upward, with thumb pointing toward you. Hold __3__ seconds.  Hold __3__ seconds. Relax slowly.  Repeat __15__ times. Do __2__ sessions per day. EVERY OTHER DAY   Activity: Use this movement to  a cup.         Copyright © [employer]. All rights reserved.     Ulnar Deviation (Strength)     This exercise is written for your right elbow. Switch sides for your left elbow.  1. Stand up straight. Hold a hand weight in your right hand. Your healthcare provider will tell you what size of hand weight to use.  2. Keep your arm straight down at your side. Bend your wrist backward to lift the weight. Dont move your arm, only your wrist.  3.  Hold __3__ seconds. Relax slowly.   Repeat __15__ times. Do __2__ sessions per day. EVERY OTHER DAY EVERY OTHER DAY         Pronation / Supination (Resistive)        Hold hammer weighing __16__ ounces and rotate palm up and down. Keep elbow flexed at side and wrist straight.  Repeat __15__ times. Do __2__ sessions per day. EVERY OTHER DAY      Copyright © I. All rights reserved.

## 2018-04-03 NOTE — PROGRESS NOTES
"Occupational Therapy Hand/ Wrist  Evaluation    Patient: Julieta Yanes  Date of Evaluation: 4/3/2018  MRN: [id number]    Diagnosis:   Encounter Diagnosis   Name Primary?    Pain in wrist, unspecified laterality      Physician: Sohan Patel Jr., *  Treatment Orders: Eval and treat     No past medical history on file.  Current Outpatient Prescriptions   Medication Sig    gabapentin (NEURONTIN) 300 MG capsule TK ONE C PO TWO TO TID    hydrocodone-acetaminophen 5-325mg (NORCO) 5-325 mg per tablet Take 1 tablet by mouth every 4 (four) hours as needed for Pain.    methocarbamol (ROBAXIN) 500 MG Tab TK 1 T PO TID PRN    naproxen (NAPROSYN) 500 MG tablet Take 500 mg by mouth 2 (two) times daily.    sertraline (ZOLOFT) 50 MG tablet Take 50 mg by mouth once daily.     No current facility-administered medications for this visit.      Review of patient's allergies indicates:  No Known Allergies  Precautions: N/A        Age: 42 y.o.  Sex: female  Hand dominance: Right      [place]   Working presently:  yes       Date of onset: s/p CTR; patient had CT for a couple years  Involved area:  B wrist   Mechanism of Injury:   Surgical on 18    Environmental Concerns/Fall Risk: None  Language: None  Cultural/Spiritual: None  Barriers to Learning: None   Abuse/Neglect/Nutritional: None  Medications:  See med card   Allergies:  See allergy card   X-Rays/Tests:  See Epic    Subjective:  Pt reports , " I have issues up and down my arm."     Pain :  At rest:3/10  With work/ Activity: 3/10  Sleepin /10  Location of Pain :hands and wrist radiating to shoulder     Patients goals for therapy are:   To regain full hand function pain-free    Objective:     Sensation: has some numbness in her hands when she is working ; left scar is more sensitive to touch than right wrist   Scar / Wound: healed scar, no drainage,           Range of Motion: AROM extension/flexion                    WNL                     "              AROM Thumb exten/flex  wnl      AROM:  Thumb opposition: thumb to  To small finger DP                                                       AROM   (R)    /   (L)     Wrist extension :    71/72  Wrist  Flexion:   75/wnl  Radial Deviation: wnl  Ulnar Deviation: 40/wnl  Supination: wnl  Pronation: wnl       Strength: ([employer] Dynamometer in lbs.), Position II  (R): 18  (L): 10       Treatment included OT evaluation, the following exercises (HEP) were instructed and pt was able to demonstrate them prior to the end of the session. HEP are as follows: start exercises with no weight for about a week, then start with weights. And received scar massage x 5 minutes to each scar, use cotton ball to left scar for desensitization       Scar Tissue Massage        Place pad of fingertip on scar area. Apply steady downward pressure while moving in circular fashion. Use another fin-jerrod on top to assist. Repeat until entire scar has been covered.  Repeat _15___ times. Do __3__ sessions per day.      Copyright © I. All rights reserved. Flexion (Resistive)        With hand palm-up and holding __16__ ounces (bottle water), bend hand toward you at wrist. Hold __3__ seconds. Relax slowly.  Repeat __15__ times. Do __2__ sessions per day. EVERY OTHER DAY     Copyright © [employer]. All rights reserved.   Extension (Resistive)        With wrist over edge of table, lift __16__ ounces, keeping arm on table surface. Hold _3___ seconds. Lower slowly.  Repeat __15__ times. Do __2__ sessions per day. EVERY OTHER DAY .EVERY OTHER DAY   Activity: Throw a Frisbee.    Copyright © [employer]. All rights reserved.     Radial Deviation (Resistive)        Holding __16__ ounces, bend wrist upward, with thumb pointing toward you. Hold __3__ seconds.  Hold __3__ seconds. Relax slowly.  Repeat __15__ times. Do __2__ sessions per day. EVERY OTHER DAY   Activity: Use this movement to  a cup.         Copyright © I. All rights reserved.     Ulnar  Deviation (Strength)     This exercise is written for your right elbow. Switch sides for your left elbow.  1. Stand up straight. Hold a hand weight in your right hand. Your healthcare provider will tell you what size of hand weight to use.  2. Keep your arm straight down at your side. Bend your wrist backward to lift the weight. Dont move your arm, only your wrist.  3.  Hold __3__ seconds. Relax slowly.   Repeat __15__ times. Do __2__ sessions per day. EVERY OTHER DAY EVERY OTHER DAY         Pronation / Supination (Resistive)        Hold hammer weighing __16__ ounces and rotate palm up and down. Keep elbow flexed at side and wrist straight.  Repeat __15__ times. Do __2__ sessions per day. EVERY OTHER DAY     Copyright © [employer]. All rights reserved.   Copyright © [employer]. All rights reserved.     Assessment:   Problem List :       Decreased ROM,  Scar formation  Decreased  and pinch strength,   Decreased muscle strength,   Decreased functional hand use,  Increased pain,   Edema    Profile and History Assessment of Occupational Performance Level of Clinical Decision Making Complexity Score   Occupational Profile:   Julieta Yanes is a 42 y.o. female who lives with their spouse and is currently employed as . Julieta Yanes has difficulty with  feeding, bathing, grooming and dressing  phone/computer use and housework/household chores  affecting his/her daily functional abilities. His/her main goal for therapy is to increase motion and reduce pain .   cormorbidites : high  cholesterol     Medical and Therapy History Review:   Brief  ?  ?  ?  ?  ?  ? Performance Deficits  Physical:  Joint Mobility  Joint Stability   Strength  Pain  Cognitive:  No Deficits  Psychosocial:   No Deficits  ? Clinical Decision Making:  high  Assessment Process:  Comprehensive Assessments  Modification/Need for Assistance:  N/A   Intervention Selection:  Several Treatment Options  ? low  Based on PMHX, co morbidities , data from assessments  and functional level of assistance required with task and clinical presentation directly impacting function.        G CODE TOOL: FOTO    Category: Self Care      Current Score  =  54%  impaired  Goal at Discharge Score =  38% impaired    Score interpretation is as follows:     TEST SCORE  Modifier  Impairment Limitation Restriction    0%  CH  0 % impaired, limited or restricted    1-19%  CI  @ least 1% but less than 20% impaired, limited or restricted    20-39%  CJ  @ least 20%<40% impaired, limited or restricted    40-59%  CK  @ least 40%<60% impaired, limited or restricted    60-79%  CL  @ least 60% <80% impaired, limited or restricted    80-99%  CM  @ least 80%<100% impaired limited or restricted    100%  CN  100% impaired, limited or restricted         Goals to be met :  1) Patient to be IND with HEP and modalities for pain management by 6-8 weeks.  2)  Pt. will increase ROM 5-10 degrees to increase functional hand use for ADLs by 6-8 weeks.  3)   Pt. will increase  strength 5-15 lbs. to grasp cup by 6-8 weeks.  4) Pt will score FOTO score of 20% less than current score and obtain goal score.       Plan:   Patient to be treated by Occupational Therapy    1-2    times per week for  60 days   during the certification period from  4-3-18  to 5-3-18     to achieve the established goals.  Treatment to include :  paraffin, fluidotherapy, manual therapy/joint mobilizations,  modalities for pain management, iontophoresis with dexamethasone, US 3mhz, therapeutic exercises/activities,        strengthening,    as well as any other treatments deemed necessary based on the patient's needs or progress.                 I certify the need for these services furnished under this plan of treatment and while under my care    ____________________________________                         __________________  Physician/Referring Practitioner                                               Date of Signature         ENRIKE Sloan,  OTR/L, CHT

## 2018-04-03 NOTE — PLAN OF CARE
"Occupational Therapy Hand/ Wrist  Evaluation    Patient: Julieta Yanes  Date of Evaluation: 4/3/2018  MRN: [id number]    Diagnosis:   Encounter Diagnosis   Name Primary?    Pain in wrist, unspecified laterality      Physician: Sohan Patel Jr., *  Treatment Orders: Eval and treat     No past medical history on file.  Current Outpatient Prescriptions   Medication Sig    gabapentin (NEURONTIN) 300 MG capsule TK ONE C PO TWO TO TID    hydrocodone-acetaminophen 5-325mg (NORCO) 5-325 mg per tablet Take 1 tablet by mouth every 4 (four) hours as needed for Pain.    methocarbamol (ROBAXIN) 500 MG Tab TK 1 T PO TID PRN    naproxen (NAPROSYN) 500 MG tablet Take 500 mg by mouth 2 (two) times daily.    sertraline (ZOLOFT) 50 MG tablet Take 50 mg by mouth once daily.     No current facility-administered medications for this visit.      Review of patient's allergies indicates:  No Known Allergies  Precautions: N/A        Age: 42 y.o.  Sex: female  Hand dominance: Right      [place]up[place]   Working presently:  yes       Date of onset: s/p CTR; patient had CT for a couple years  Involved area:  B wrist   Mechanism of Injury:   Surgical on 18    Environmental Concerns/Fall Risk: None  Language: None  Cultural/Spiritual: None  Barriers to Learning: None   Abuse/Neglect/Nutritional: None  Medications:  See med card   Allergies:  See allergy card   X-Rays/Tests:  See Epic    Subjective:  Pt reports , " I have issues up and down my arm."     Pain :  At rest:3/10  With work/ Activity: 3/10  Sleepin /10  Location of Pain :hands and wrist radiating to shoulder     Patients goals for therapy are:   To regain full hand function pain-free    Objective:     Sensation: has some numbness in her hands when she is working ; left scar is more sensitive to touch than right wrist   Scar / Wound: healed scar, no drainage,           Range of Motion: AROM extension/flexion                    WNL                     "              AROM Thumb exten/flex  wnl      AROM:  Thumb opposition: thumb to  To small finger DP                                                       AROM   (R)    /   (L)     Wrist extension :    71/72  Wrist  Flexion:   75/wnl  Radial Deviation: wnl  Ulnar Deviation: 40/wnl  Supination: wnl  Pronation: wnl       Strength: ([employer] Dynamometer in lbs.), Position II  (R): 18  (L): 10       Treatment included OT evaluation, the following exercises (HEP) were instructed and pt was able to demonstrate them prior to the end of the session. HEP are as follows: start exercises with no weight for about a week, then start with weights. And received scar massage x 5 minutes to each scar, use cotton ball to left scar for desensitization       Scar Tissue Massage        Place pad of fingertip on scar area. Apply steady downward pressure while moving in circular fashion. Use another fin-jerrod on top to assist. Repeat until entire scar has been covered.  Repeat _15___ times. Do __3__ sessions per day.      Copyright © I. All rights reserved. Flexion (Resistive)        With hand palm-up and holding __16__ ounces (bottle water), bend hand toward you at wrist. Hold __3__ seconds. Relax slowly.  Repeat __15__ times. Do __2__ sessions per day. EVERY OTHER DAY     Copyright © [employer]. All rights reserved.   Extension (Resistive)        With wrist over edge of table, lift __16__ ounces, keeping arm on table surface. Hold _3___ seconds. Lower slowly.  Repeat __15__ times. Do __2__ sessions per day. EVERY OTHER DAY .EVERY OTHER DAY   Activity: Throw a Frisbee.    Copyright © [employer]. All rights reserved.     Radial Deviation (Resistive)        Holding __16__ ounces, bend wrist upward, with thumb pointing toward you. Hold __3__ seconds.  Hold __3__ seconds. Relax slowly.  Repeat __15__ times. Do __2__ sessions per day. EVERY OTHER DAY   Activity: Use this movement to  a cup.         Copyright © I. All rights reserved.     Ulnar  Deviation (Strength)     This exercise is written for your right elbow. Switch sides for your left elbow.  1. Stand up straight. Hold a hand weight in your right hand. Your healthcare provider will tell you what size of hand weight to use.  2. Keep your arm straight down at your side. Bend your wrist backward to lift the weight. Dont move your arm, only your wrist.  3.  Hold __3__ seconds. Relax slowly.   Repeat __15__ times. Do __2__ sessions per day. EVERY OTHER DAY EVERY OTHER DAY         Pronation / Supination (Resistive)        Hold hammer weighing __16__ ounces and rotate palm up and down. Keep elbow flexed at side and wrist straight.  Repeat __15__ times. Do __2__ sessions per day. EVERY OTHER DAY     Copyright © [employer]. All rights reserved.   Copyright © [employer]. All rights reserved.     Assessment:   Problem List :       Decreased ROM,  Scar formation  Decreased  and pinch strength,   Decreased muscle strength,   Decreased functional hand use,  Increased pain,   Edema    Profile and History Assessment of Occupational Performance Level of Clinical Decision Making Complexity Score   Occupational Profile:   Julieta Yanes is a 42 y.o. female who lives with their spouse and is currently employed as . Julieta Yanes has difficulty with  feeding, bathing, grooming and dressing  phone/computer use and housework/household chores  affecting his/her daily functional abilities. His/her main goal for therapy is to increase motion and reduce pain .   cormorbidites : high  cholesterol     Medical and Therapy History Review:   Brief  ?  ?  ?  ?  ?  ? Performance Deficits  Physical:  Joint Mobility  Joint Stability   Strength  Pain  Cognitive:  No Deficits  Psychosocial:   No Deficits  ? Clinical Decision Making:  high  Assessment Process:  Comprehensive Assessments  Modification/Need for Assistance:  N/A   Intervention Selection:  Several Treatment Options  ? low  Based on PMHX, co morbidities , data from assessments  and functional level of assistance required with task and clinical presentation directly impacting function.        G CODE TOOL: FOTO    Category: Self Care      Current Score  =  54%  impaired  Goal at Discharge Score =  38% impaired    Score interpretation is as follows:     TEST SCORE  Modifier  Impairment Limitation Restriction    0%  CH  0 % impaired, limited or restricted    1-19%  CI  @ least 1% but less than 20% impaired, limited or restricted    20-39%  CJ  @ least 20%<40% impaired, limited or restricted    40-59%  CK  @ least 40%<60% impaired, limited or restricted    60-79%  CL  @ least 60% <80% impaired, limited or restricted    80-99%  CM  @ least 80%<100% impaired limited or restricted    100%  CN  100% impaired, limited or restricted         Goals to be met :  1) Patient to be IND with HEP and modalities for pain management by 6-8 weeks.  2)  Pt. will increase ROM 5-10 degrees to increase functional hand use for ADLs by 6-8 weeks.  3)   Pt. will increase  strength 5-15 lbs. to grasp cup by 6-8 weeks.  4) Pt will score FOTO score of 20% less than current score and obtain goal score.       Plan:   Patient to be treated by Occupational Therapy    1-2    times per week for  60 days   during the certification period from  4-3-18  to 5-3-18     to achieve the established goals.  Treatment to include :  paraffin, fluidotherapy, manual therapy/joint mobilizations,  modalities for pain management, iontophoresis with dexamethasone, US 3mhz, therapeutic exercises/activities,        strengthening,    as well as any other treatments deemed necessary based on the patient's needs or progress.                 I certify the need for these services furnished under this plan of treatment and while under my care    ____________________________________                         __________________  Physician/Referring Practitioner                                               Date of Signature         ENRIKE Sloan,  OTR/L, CHT

## 2018-04-16 ENCOUNTER — CLINICAL SUPPORT (OUTPATIENT)
Dept: REHABILITATION | Facility: HOSPITAL | Age: 43
End: 2018-04-16
Attending: ORTHOPAEDIC SURGERY
Payer: COMMERCIAL

## 2018-04-16 DIAGNOSIS — M25.539 PAIN IN WRIST, UNSPECIFIED LATERALITY: ICD-10-CM

## 2018-04-16 PROCEDURE — 97110 THERAPEUTIC EXERCISES: CPT | Mod: PO

## 2018-04-16 PROCEDURE — 97140 MANUAL THERAPY 1/> REGIONS: CPT | Mod: PO

## 2018-04-16 PROCEDURE — 97035 APP MDLTY 1+ULTRASOUND EA 15: CPT | Mod: PO

## 2018-04-16 PROCEDURE — 97018 PARAFFIN BATH THERAPY: CPT | Mod: PO

## 2018-04-16 NOTE — PROGRESS NOTES
"Occupational Therapy Hand/ Wrist  Evaluation     Patient: Julieta Yanes  Date: 2018    Date of Evaluation: 4/3/2018  MRN: [id number]     Diagnosis:        Encounter Diagnosis   Name Primary?    Pain in wrist, unspecified laterality        Physician: Sohan Patel Jr., *  Treatment Orders: Eval and treat    Review of patient's allergies indicates:  No Known Allergies  Precautions: N/A       Age: 42 y.o.  Sex: female  Hand dominance: Right        [place]up[place]   Working presently:  yes         Date of onset: s/p CTR; patient had CT for a couple years  Involved area:  B wrist   Mechanism of Injury:   Surgical on 18     Environmental Concerns/Fall Risk: None  Language: None  Cultural/Spiritual: None  Barriers to Learning: None   Abuse/Neglect/Nutritional: None  Medications:  See med card   Allergies:  See allergy card   X-Rays/Tests:  See Epic     Visit #:      Subjective:  Pt reports , "I do a lot with both my hands. As a  its constantly being used"      Pain :  At rest:3/10  With work/ Activity: 3/10  Sleepin /10  Location of Pain :hands and wrist radiating to shoulder      Patients goals for therapy are:   To regain full hand function pain-free     Objective:   treatment as follows  Patient received ultrasound to B/L carpal tunnel area to increase blood flow, circulation, tissue elasticity, pain management and for wound/scar management for 6 right and 6  Minutes left @ 3.3 Mhz, Intensity .08 w/cm2 at 100% duty cycle.  Desensitization protocol  Rubbing various types of materials over scars  Retrograde massage x 20 min  Prayer/reverse prayer stretches x 20 seconds x 5  Theraputty: B/Lsoft yellow: gross grasp 2 min, flatten into willie and use highligher to dimple x 2 min, roll into snake chop using scrapping tool. X 2 min. Roll into snake and pinch 3 pt/2 pt x 2 min, intrinsic scissoring finger/abd/add x 2min  Wrist flexion and extension RD x 2 lbs       AROM:  Thumb " opposition: thumb to  To small finger DP                                                       AROM   (R)    /   (L)      Wrist extension :    71/72  Wrist  Flexion:   75/wnl  Radial Deviation: wnl  Ulnar Deviation: 40/wnl  Supination: wnl  Pronation: wnl         Strength: ([employer] Dynamometer in lbs.), Position II  (R): 18  (L): 10         Treatment included OT evaluation, the following exercises (HEP) were instructed and pt was able to demonstrate them prior to the end of the session. HEP are as follows: start exercises with no weight for about a week, then start with weights. And received scar massage x 5 minutes to each scar, use cotton ball to left scar for desensitization        Assessment: Pt arrives to therapy presenting with notable hyper sensitivity in the wrist. She was instructed in wrist flexion and extension prayer stretching. Pt appears to be guarded with tissue healing along the scar. Pt encouraged to apply various types of friction to area. Left hand worse than right. Patient has weakness B/L overall deconditioning as evident by poor performance of wrist strengthening. Therapy services recommended to continue at this time.        G CODE TOOL: FOTO     Category: Self Care      Current Score  =  54%  impaired  Goal at Discharge Score =  38% impaired    Score interpretation is as follows:      TEST SCORE  Modifier  Impairment Limitation Restriction    0%  CH  0 % impaired, limited or restricted    1-19%  CI  @ least 1% but less than 20% impaired, limited or restricted    20-39%  CJ  @ least 20%<40% impaired, limited or restricted    40-59%  CK  @ least 40%<60% impaired, limited or restricted    60-79%  CL  @ least 60% <80% impaired, limited or restricted    80-99%  CM  @ least 80%<100% impaired limited or restricted    100%  CN  100% impaired, limited or restricted         Goals to be met :  1) Patient to be IND with HEP and modalities for pain management by 6-8 weeks.  2)  Pt. will increase ROM 5-10  degrees to increase functional hand use for ADLs by 6-8 weeks.  3)   Pt. will increase  strength 5-15 lbs. to grasp cup by 6-8 weeks.  4) Pt will score FOTO score of 20% less than current score and obtain goal score.         Plan:   Patient to be treated by Occupational Therapy    1-2    times per week for  60 days   during the certification period from  4-3-18  to 5-3-18     to achieve the established goals.  Treatment to include :  paraffin, fluidotherapy, manual therapy/joint mobilizations,  modalities for pain management, iontophoresis with dexamethasone, US 3mhz, therapeutic exercises/activities,        strengthening,    as well as any other treatments deemed necessary based on the patient's needs or progress.                   I certify the need for these services furnished under this plan of treatment and while under my care     Saul HORVATH/FOUZIA

## 2018-04-23 ENCOUNTER — CLINICAL SUPPORT (OUTPATIENT)
Dept: REHABILITATION | Facility: HOSPITAL | Age: 43
End: 2018-04-23
Attending: ORTHOPAEDIC SURGERY
Payer: COMMERCIAL

## 2018-04-23 DIAGNOSIS — M25.539 PAIN IN WRIST, UNSPECIFIED LATERALITY: ICD-10-CM

## 2018-04-23 PROCEDURE — 97035 APP MDLTY 1+ULTRASOUND EA 15: CPT | Mod: PO

## 2018-04-23 PROCEDURE — 97140 MANUAL THERAPY 1/> REGIONS: CPT | Mod: PO

## 2018-04-23 PROCEDURE — 97110 THERAPEUTIC EXERCISES: CPT | Mod: PO

## 2018-04-23 NOTE — PROGRESS NOTES
"Occupational Therapy Hand/ Wrist  Evaluation     Patient: Julieta Yanes  Date: 2018    Date of Evaluation: 4/3/2018  MRN: [id number]     Diagnosis:        Encounter Diagnosis   Name Primary?    Pain in wrist, unspecified laterality        Physician: Sohan Patel Jr., *  Treatment Orders: Eval and treat    Review of patient's allergies indicates:  No Known Allergies  Precautions: N/A       Age: 42 y.o.  Sex: female  Hand dominance: Right        [place]   Working presently:  yes         Date of onset: s/p CTR; patient had CT for a couple years  Involved area:  B wrist   Mechanism of Injury:   Surgical on 20     Environmental Concerns/Fall Risk: None  Language: None  Cultural/Spiritual: None  Barriers to Learning: None   Abuse/Neglect/Nutritional: None  Medications:  See med card   Allergies:  See allergy card   X-Rays/Tests:  See Epic     Visit #: 3/20     Subjective:  Pt reports , " I will be traveling for several weeks, It has been feeling much better all at once. I will schedule for when I get back"      Pain :  At rest:3/10  With work/ Activity: 310  Sleepin /10  Location of Pain : hands and wrist radiating to shoulder      Patients goals for therapy are:   To regain full hand function pain-free     Objective:   treatment as follows  Patient received fluidotherapy to right hand(s) for 10 minutes to increase blood flow, circulation, desensitization, sensory re-education and for pain management.   Theraputty: orange: gross grasp 2 min, flatten into willie and use highligher to dimple x 2 min, roll into snake chop using scrapping tool. X 2 min. Roll into snake and pinch 3 pt/2 pt x 2 min, intrinsic scissoring finger/abd/add x 2min      Patient received ultrasound to B/L carpal tunnel area to increase blood flow, circulation, tissue elasticity, pain management and for wound/scar management for 6 right and 6  Minutes left @ 3.3 Mhz, Intensity .08 w/cm2 at 100% duty " cycle.  Desensitization protocol  Rubbing various types of materials over scars  Retrograde massage x 20 min  Prayer/reverse prayer stretches x 20 seconds x 5  Theraputty: B/Lsoft yellow: gross grasp 2 min, flatten into willie and use highligher to dimple x 2 min, roll into snake chop using scrapping tool. X 2 min. Roll into snake and pinch 3 pt/2 pt x 2 min, intrinsic scissoring finger/abd/add x 2min  Wrist flexion and extension RD x 2 lbs       AROM:  Thumb opposition: thumb to  To small finger DP                                                       AROM   (R)    /   (L)      Wrist extension :    71/72  Wrist  Flexion:   75/wnl  Radial Deviation: wnl  Ulnar Deviation: 40/wnl  Supination: wnl  Pronation: wnl         Strength: (EVENS Dynamometer in lbs.), Position II  (R): 18  (L): 10         Treatment included OT evaluation, the following exercises (HEP) were instructed and pt was able to demonstrate them prior to the end of the session. HEP are as follows: start exercises with no weight for about a week, then start with weights. And received scar massage x 5 minutes to each scar, use cotton ball to left scar for desensitization        Assessment: Pt returns to therapy with decreased accounts of hypersensitivity. All therex performed in a pain free manner with improved strength. Julieta was able to perform all therapist activities with full bodyweight through activities. She reports she will be taveling to [place] to see family and will resume therapy upon her return.       G CODE TOOL: FOTO     Category: Self Care      Current Score  =  54%  impaired  Goal at Discharge Score =  38% impaired    Score interpretation is as follows:      TEST SCORE  Modifier  Impairment Limitation Restriction    0%  CH  0 % impaired, limited or restricted    1-19%  CI  @ least 1% but less than 20% impaired, limited or restricted    20-39%  CJ  @ least 20%<40% impaired, limited or restricted    40-59%  CK  @ least 40%<60%  impaired, limited or restricted    60-79%  CL  @ least 60% <80% impaired, limited or restricted    80-99%  CM  @ least 80%<100% impaired limited or restricted    100%  CN  100% impaired, limited or restricted         Goals to be met :  1) Patient to be IND with HEP and modalities for pain management by 6-8 weeks.  2)  Pt. will increase ROM 5-10 degrees to increase functional hand use for ADLs by 6-8 weeks.  3)   Pt. will increase  strength 5-15 lbs. to grasp cup by 6-8 weeks.  4) Pt will score FOTO score of 20% less than current score and obtain goal score.         Plan:   Patient to be treated by Occupational Therapy    1-2    times per week for  60 days   during the certification period from  4-3-18  to 5-3-18     to achieve the established goals.  Treatment to include :  paraffin, fluidotherapy, manual therapy/joint mobilizations,  modalities for pain management, iontophoresis with dexamethasone, US 3mhz, therapeutic exercises/activities,        strengthening,    as well as any other treatments deemed necessary based on the patient's needs or progress.                   I certify the need for these services furnished under this plan of treatment and while under my care     Saul PHILLIPS OTR/L

## 2018-06-28 ENCOUNTER — DOCUMENTATION ONLY (OUTPATIENT)
Dept: REHABILITATION | Facility: HOSPITAL | Age: 43
End: 2018-06-28

## 2018-10-15 ENCOUNTER — OFFICE VISIT (OUTPATIENT)
Dept: URGENT CARE | Facility: CLINIC | Age: 43
End: 2018-10-15
Payer: COMMERCIAL

## 2018-10-15 VITALS
OXYGEN SATURATION: 97 % | WEIGHT: 180 LBS | RESPIRATION RATE: 18 BRPM | BODY MASS INDEX: 33.13 KG/M2 | SYSTOLIC BLOOD PRESSURE: 120 MMHG | TEMPERATURE: 98 F | HEIGHT: 62 IN | HEART RATE: 67 BPM | DIASTOLIC BLOOD PRESSURE: 77 MMHG

## 2018-10-15 DIAGNOSIS — J06.9 VIRAL URI WITH COUGH: Primary | ICD-10-CM

## 2018-10-15 PROCEDURE — 3008F BODY MASS INDEX DOCD: CPT | Mod: CPTII,S$GLB,, | Performed by: PHYSICIAN ASSISTANT

## 2018-10-15 PROCEDURE — 99214 OFFICE O/P EST MOD 30 MIN: CPT | Mod: 25,S$GLB,, | Performed by: PHYSICIAN ASSISTANT

## 2018-10-15 PROCEDURE — 96372 THER/PROPH/DIAG INJ SC/IM: CPT | Mod: S$GLB,,, | Performed by: PHYSICIAN ASSISTANT

## 2018-10-15 RX ORDER — MUPIROCIN 20 MG/G
OINTMENT TOPICAL
Qty: 22 G | Refills: 1 | Status: SHIPPED | OUTPATIENT
Start: 2018-10-15

## 2018-10-15 RX ORDER — CODEINE PHOSPHATE AND GUAIFENESIN 10; 100 MG/5ML; MG/5ML
5 SOLUTION ORAL 3 TIMES DAILY PRN
Qty: 120 ML | Refills: 0 | Status: SHIPPED | OUTPATIENT
Start: 2018-10-15 | End: 2018-10-25

## 2018-10-15 RX ORDER — BETAMETHASONE SODIUM PHOSPHATE AND BETAMETHASONE ACETATE 3; 3 MG/ML; MG/ML
6 INJECTION, SUSPENSION INTRA-ARTICULAR; INTRALESIONAL; INTRAMUSCULAR; SOFT TISSUE
Status: COMPLETED | OUTPATIENT
Start: 2018-10-15 | End: 2018-10-15

## 2018-10-15 RX ORDER — BENZONATATE 100 MG/1
200 CAPSULE ORAL 3 TIMES DAILY PRN
Qty: 40 CAPSULE | Refills: 0 | Status: SHIPPED | OUTPATIENT
Start: 2018-10-15

## 2018-10-15 RX ADMIN — BETAMETHASONE SODIUM PHOSPHATE AND BETAMETHASONE ACETATE 6 MG: 3; 3 INJECTION, SUSPENSION INTRA-ARTICULAR; INTRALESIONAL; INTRAMUSCULAR; SOFT TISSUE at 07:10

## 2018-10-15 NOTE — LETTER
October 15, 2018      [facility]  [place] [address]  Phone: [phone]  Fax: [phone]       Patient: Julieta Yanes   YOB: 1975  Date of Visit: 10/15/2018    To Whom It May Concern:    Murali Yanes  was at [facility] on 10/15/2018. She may return to work/school on 10/16/18 with no restrictions. If you have any questions or concerns, or if I can be of further assistance, please do not hesitate to contact me.    Sincerely,    Darcy Wilkinson PA-C

## 2018-10-16 NOTE — PATIENT INSTRUCTIONS
Viral Upper Respiratory Illness (Adult)  You have a viral upper respiratory illness (URI), which is another term for the common cold. This illness is contagious during the first few days. It is spread through the air by coughing and sneezing. It may also be spread by direct contact (touching the sick person and then touching your own eyes, nose, or mouth). Frequent handwashing will decrease risk of spread. Most viral illnesses go away within 7 to 10 days with rest and simple home remedies. Sometimes the illness may last for several weeks. Antibiotics will not kill a virus, and they are generally not prescribed for this condition.    Home care  · If symptoms are severe, rest at home for the first 2 to 3 days. When you resume activity, don't let yourself get too tired.  · Avoid being exposed to cigarette smoke (yours or others).  · You may use acetaminophen or ibuprofen to control pain and fever, unless another medicine was prescribed. (Note: If you have chronic liver or kidney disease, have ever had a stomach ulcer or gastrointestinal bleeding, or are taking blood-thinning medicines, talk with your healthcare provider before using these medicines.) Aspirin should never be given to anyone under 18 years of age who is ill with a viral infection or fever. It may cause severe liver or brain damage.  · Your appetite may be poor, so a light diet is fine. Avoid dehydration by drinking 6 to 8 glasses of fluids per day (water, soft drinks, juices, tea, or soup). Extra fluids will help loosen secretions in the nose and lungs.  · Over-the-counter cold medicines will not shorten the length of time youre sick, but they may be helpful for the following symptoms: cough, sore throat, and nasal and sinus congestion. (Note: Do not use decongestants if you have high blood pressure.)  Follow-up care  Follow up with your healthcare provider, or as advised.  When to seek medical advice  Call your healthcare provider right away if any  of these occur:  · Cough with lots of colored sputum (mucus)  · Severe headache; face, neck, or ear pain  · Difficulty swallowing due to throat pain  · Fever of 100.4°F (38°C)  Call 911, or get immediate medical care  Call emergency services right away if any of these occur:  · Chest pain, shortness of breath, wheezing, or difficulty breathing  · Coughing up blood  · Inability to swallow due to throat pain  Date Last Reviewed: 9/13/2015  © 1926-3704 [employer]. [address] [place] [address]. All rights reserved. This information is not intended as a substitute for professional medical care. Always follow your healthcare professional's instructions.      Please follow up with your Primary care provider within 2-5 days if your signs and symptoms have not resolved or worsen.     If your condition worsens or fails to improve we recommend that you receive another evaluation at the emergency room immediately or contact your primary medical clinic to discuss your concerns.   You must understand that you have received an Urgent Care treatment only and that you may be released before all of your medical problems are known or treated. You, the patient, will arrange for follow up care as instructed.     RED FLAGS/WARNING SYMPTOMS DISCUSSED WITH PATIENT THAT WOULD WARRANT EMERGENT MEDICAL ATTENTION. PATIENT VERBALIZED UNDERSTANDING.

## 2021-04-16 ENCOUNTER — PATIENT MESSAGE (OUTPATIENT)
Dept: RESEARCH | Facility: HOSPITAL | Age: 46
End: 2021-04-16

## 2023-02-22 NOTE — TELEPHONE ENCOUNTER
----- Message from Antonette Hines sent at 8/31/2017  7:59 AM CDT -----  Contact: [phone] /self   Patient states she is stuck in traffic and will be late for her 8 AM appointment. Please advise.   Never

## (undated) DEVICE — APPLICATOR CHLORAPREP ORN 26ML

## (undated) DEVICE — PAD PREP 50/CA

## (undated) DEVICE — STOCKINET 4INX48

## (undated) DEVICE — SUT MONOCRYL 4-0 PS-2

## (undated) DEVICE — SEE MEDLINE ITEM 157131

## (undated) DEVICE — GLOVE SURG BIOGEL LATEX SZ 7.5

## (undated) DEVICE — PAD CAST SPECIALIST 2X4

## (undated) DEVICE — COVER OVERHEAD SURG LT BLUE

## (undated) DEVICE — BANDAGE ELASTIC 2X5 VELCRO ST

## (undated) DEVICE — ADHESIVE DERMABOND ADVANCED

## (undated) DEVICE — TOURNIQUET SB QC DP 18X4IN

## (undated) DEVICE — SEE MEDLINE ITEM 157117

## (undated) DEVICE — SPLINT WRIST FOAM 8.5IN LG/RT

## (undated) DEVICE — SEE MEDLINE ITEM 152622

## (undated) DEVICE — BANDAGE ELASTIC 3X5 VELCRO ST

## (undated) DEVICE — ELECTRODE REM PLYHSV RETURN 9

## (undated) DEVICE — INSTRUMENT SUCTION FRAZIER 12F

## (undated) DEVICE — BLADE SCALP OPHTL BEVEL STR

## (undated) DEVICE — BANDAGE ACE NON LATEX 2IN

## (undated) DEVICE — SEE MEDLINE ITEM 152522

## (undated) DEVICE — BLADE SURG #15 CARBON STEEL

## (undated) DEVICE — SEE MEDLINE ITEM 157173

## (undated) DEVICE — GAUZE SPONGE 4X4 12PLY

## (undated) DEVICE — SPLINT WRIST FOAM 8.8IN LG/LFT

## (undated) DEVICE — SEE MEDLINE ITEM 156955

## (undated) DEVICE — PAD CAST 2 IN X 4YDS STERILE

## (undated) DEVICE — NDL HYPO REG 25G X 1 1/2

## (undated) DEVICE — PAD CAST SPECIALIST STRL 3

## (undated) DEVICE — PACK BASIC

## (undated) DEVICE — BANDAGE ACE NON LATEX 3IN

## (undated) DEVICE — SEE MEDLINE ITEM 157116

## (undated) DEVICE — MANIFOLD 4 PORT

## (undated) DEVICE — PADDING CAST 2IN DELTA ROLL

## (undated) DEVICE — SYR 10CC LUER LOCK